# Patient Record
Sex: FEMALE | Race: BLACK OR AFRICAN AMERICAN | Employment: OTHER | URBAN - METROPOLITAN AREA
[De-identification: names, ages, dates, MRNs, and addresses within clinical notes are randomized per-mention and may not be internally consistent; named-entity substitution may affect disease eponyms.]

---

## 2019-10-05 LAB

## 2020-09-04 RX ORDER — NORGESTIMATE AND ETHINYL ESTRADIOL 0.25-0.035
1 KIT ORAL DAILY
Qty: 30 TABLET | Refills: 5 | Status: SHIPPED
Start: 2020-09-04 | End: 2020-11-23 | Stop reason: SDUPTHER

## 2020-09-04 RX ORDER — NORGESTIMATE AND ETHINYL ESTRADIOL 0.25-0.035
.25-35 KIT ORAL DAILY
COMMUNITY
Start: 2020-08-11 | End: 2020-09-04 | Stop reason: SDUPTHER

## 2020-09-16 RX ORDER — LORAZEPAM 0.5 MG/1
0.5 TABLET ORAL DAILY
COMMUNITY
Start: 2020-08-13 | End: 2020-09-16 | Stop reason: SDUPTHER

## 2020-09-16 RX ORDER — CITALOPRAM 40 MG/1
TABLET ORAL
COMMUNITY
Start: 2020-09-15 | End: 2020-10-30

## 2020-09-16 RX ORDER — LORAZEPAM 0.5 MG/1
0.25 TABLET ORAL DAILY
Qty: 16 TABLET | Refills: 0 | Status: SHIPPED
Start: 2020-09-16 | End: 2020-09-30 | Stop reason: SDUPTHER

## 2020-09-29 RX ORDER — NAPROXEN SODIUM 220 MG
220 TABLET ORAL DAILY PRN
COMMUNITY
End: 2020-11-23 | Stop reason: SDUPTHER

## 2020-09-29 RX ORDER — TRIAMCINOLONE ACETONIDE 1 MG/G
CREAM TOPICAL 2 TIMES DAILY PRN
COMMUNITY
End: 2022-02-11

## 2020-09-29 RX ORDER — IBUPROFEN 200 MG
400 TABLET ORAL EVERY 8 HOURS PRN
COMMUNITY
End: 2020-11-23 | Stop reason: SDUPTHER

## 2020-09-29 RX ORDER — ACETAMINOPHEN 160 MG/5ML
SOLUTION ORAL EVERY 4 HOURS PRN
COMMUNITY
End: 2020-11-23

## 2020-09-30 RX ORDER — LORAZEPAM 0.5 MG/1
0.25 TABLET ORAL DAILY
Qty: 16 TABLET | Refills: 0 | Status: SHIPPED
Start: 2020-09-30 | End: 2020-10-30 | Stop reason: SDUPTHER

## 2020-10-30 RX ORDER — LORAZEPAM 0.5 MG/1
0.25 TABLET ORAL DAILY
Qty: 16 TABLET | Refills: 0 | Status: SHIPPED
Start: 2020-10-30 | End: 2020-11-23 | Stop reason: SDUPTHER

## 2020-10-30 RX ORDER — CITALOPRAM 40 MG/1
40 TABLET ORAL DAILY
Qty: 31 TABLET | Refills: 5 | Status: SHIPPED
Start: 2020-10-30 | End: 2020-11-23 | Stop reason: SDUPTHER

## 2020-11-23 ENCOUNTER — OFFICE VISIT (OUTPATIENT)
Dept: PRIMARY CARE CLINIC | Age: 30
End: 2020-11-23
Payer: MEDICARE

## 2020-11-23 VITALS
TEMPERATURE: 97.6 F | SYSTOLIC BLOOD PRESSURE: 102 MMHG | HEIGHT: 64 IN | DIASTOLIC BLOOD PRESSURE: 68 MMHG | BODY MASS INDEX: 13.83 KG/M2 | OXYGEN SATURATION: 93 % | HEART RATE: 109 BPM | WEIGHT: 81 LBS

## 2020-11-23 PROCEDURE — 99214 OFFICE O/P EST MOD 30 MIN: CPT | Performed by: NURSE PRACTITIONER

## 2020-11-23 PROCEDURE — 90688 IIV4 VACCINE SPLT 0.5 ML IM: CPT | Performed by: NURSE PRACTITIONER

## 2020-11-23 PROCEDURE — G0008 ADMIN INFLUENZA VIRUS VAC: HCPCS | Performed by: NURSE PRACTITIONER

## 2020-11-23 RX ORDER — DOCUSATE SODIUM 100 MG/1
100 CAPSULE, LIQUID FILLED ORAL 2 TIMES DAILY
Qty: 60 CAPSULE | Refills: 0 | Status: SHIPPED
Start: 2020-11-23 | End: 2020-12-15 | Stop reason: SDUPTHER

## 2020-11-23 RX ORDER — LORAZEPAM 0.5 MG/1
0.25 TABLET ORAL DAILY
Qty: 16 TABLET | Refills: 0 | Status: SHIPPED
Start: 2020-11-23 | End: 2020-12-24

## 2020-11-23 RX ORDER — ACETAMINOPHEN 325 MG/1
650 TABLET ORAL EVERY 6 HOURS PRN
COMMUNITY
End: 2020-11-23 | Stop reason: SDUPTHER

## 2020-11-23 RX ORDER — NAPROXEN SODIUM 220 MG
220 TABLET ORAL DAILY PRN
Qty: 30 TABLET | Refills: 5 | Status: SHIPPED
Start: 2020-11-23 | End: 2021-10-12

## 2020-11-23 RX ORDER — ACETAMINOPHEN 325 MG/1
325 TABLET ORAL EVERY 6 HOURS PRN
Qty: 120 TABLET | Refills: 5 | Status: SHIPPED
Start: 2020-11-23 | End: 2022-04-15 | Stop reason: SDUPTHER

## 2020-11-23 RX ORDER — CITALOPRAM 40 MG/1
40 TABLET ORAL DAILY
Qty: 30 TABLET | Refills: 5 | Status: SHIPPED
Start: 2020-11-23 | End: 2021-09-23

## 2020-11-23 RX ORDER — PYRIDOXINE HCL (VITAMIN B6) 100 MG
500 TABLET ORAL 2 TIMES DAILY
Qty: 60 CAPSULE | Refills: 3 | Status: SHIPPED
Start: 2020-11-23 | End: 2021-02-25

## 2020-11-23 RX ORDER — CLOTRIMAZOLE AND BETAMETHASONE DIPROPIONATE 10; .64 MG/G; MG/G
CREAM TOPICAL
Qty: 1 TUBE | Refills: 3 | Status: SHIPPED
Start: 2020-11-23 | End: 2022-02-11

## 2020-11-23 RX ORDER — IBUPROFEN 200 MG
200 TABLET ORAL EVERY 8 HOURS PRN
Qty: 120 TABLET | Refills: 5 | Status: SHIPPED
Start: 2020-11-23 | End: 2021-02-15 | Stop reason: SDUPTHER

## 2020-11-23 RX ORDER — NORGESTIMATE AND ETHINYL ESTRADIOL 0.25-0.035
1 KIT ORAL DAILY
Qty: 30 TABLET | Refills: 5 | Status: SHIPPED
Start: 2020-11-23 | End: 2021-07-13

## 2020-11-23 ASSESSMENT — ENCOUNTER SYMPTOMS
BACK PAIN: 0
TROUBLE SWALLOWING: 0
ABDOMINAL PAIN: 0
CONSTIPATION: 0
BLOOD IN STOOL: 0
DIARRHEA: 0
WHEEZING: 0
CHEST TIGHTNESS: 0
NAUSEA: 0
COUGH: 0
SHORTNESS OF BREATH: 0
VOICE CHANGE: 0
VOMITING: 0

## 2020-11-23 ASSESSMENT — PATIENT HEALTH QUESTIONNAIRE - PHQ9
2. FEELING DOWN, DEPRESSED OR HOPELESS: 0
SUM OF ALL RESPONSES TO PHQ QUESTIONS 1-9: 0
SUM OF ALL RESPONSES TO PHQ9 QUESTIONS 1 & 2: 0
1. LITTLE INTEREST OR PLEASURE IN DOING THINGS: 0

## 2020-11-23 NOTE — PATIENT INSTRUCTIONS
Patient Education        Medication Refill: Care Instructions  Your Care Instructions     Medicines are a big part of treatment for many health problems. So it can be upsetting to run out of your medicine. It may even be dangerous to stop a medicine suddenly. The doctor may have given you enough medicine to help you until you can see your regular doctor. The doctor has checked you carefully, but problems can develop later. If you notice any problems or new symptoms, get medical treatment right away. Follow-up care is a key part of your treatment and safety. Be sure to make and go to all appointments, and call your doctor if you are having problems. It's also a good idea to know your test results and keep a list of the medicines you take. How can you care for yourself at home? · Be safe with medicines. Take your medicines exactly as prescribed. · Know when you will run out of your medicine. Use a calendar to remind you to get a refill. Don't wait until you have only a few pills left. · Talk with your doctor or pharmacist about your medicine. Find out what to do if you miss a dose. When should you call for help? Call your doctor now or seek immediate medical care if:    · You have problems with your medicine. Watch closely for changes in your health, and be sure to contact your doctor if you have any problems. Where can you learn more? Go to https://[employer]. org and sign in to your [employer] account. Enter K326 in the [facility] box to learn more about \"Medication Refill: Care Instructions. \"     If you do not have an account, please click on the \"Sign Up Now\" link. Current as of: August 22, 2019               Content Version: 12.6  © 1169-9922 [employer] for [employer] and Android, Incorporated. Care instructions adapted under license by [facility] ([facility]).  If you have questions about a medical condition or this instruction, always ask your healthcare professional. [place] any warranty or liability for your use of this information.

## 2020-12-15 RX ORDER — DOCUSATE SODIUM 100 MG/1
100 CAPSULE, LIQUID FILLED ORAL 2 TIMES DAILY
Qty: 62 CAPSULE | Refills: 0 | Status: SHIPPED
Start: 2020-12-15 | End: 2020-12-22 | Stop reason: SDUPTHER

## 2020-12-22 RX ORDER — DOCUSATE SODIUM 100 MG/1
100 CAPSULE, LIQUID FILLED ORAL 2 TIMES DAILY
Qty: 62 CAPSULE | Refills: 5 | Status: SHIPPED
Start: 2020-12-22 | End: 2020-12-23 | Stop reason: SDUPTHER

## 2020-12-23 RX ORDER — DOCUSATE SODIUM 100 MG/1
100 CAPSULE, LIQUID FILLED ORAL 2 TIMES DAILY
Qty: 62 CAPSULE | Refills: 5 | Status: SHIPPED | OUTPATIENT
Start: 2020-12-23 | End: 2021-01-22

## 2020-12-28 RX ORDER — LORAZEPAM 0.5 MG/1
0.25 TABLET ORAL DAILY
Qty: 16 TABLET | Refills: 0 | Status: SHIPPED
Start: 2020-12-28 | End: 2021-01-25 | Stop reason: SDUPTHER

## 2020-12-28 RX ORDER — LORAZEPAM 0.5 MG/1
0.25 TABLET ORAL DAILY
COMMUNITY
End: 2020-12-28 | Stop reason: SDUPTHER

## 2021-01-25 DIAGNOSIS — F41.9 ANXIETY: ICD-10-CM

## 2021-01-25 RX ORDER — LORAZEPAM 0.5 MG/1
0.25 TABLET ORAL DAILY
Qty: 16 TABLET | Refills: 0 | Status: SHIPPED
Start: 2021-01-25 | End: 2021-07-28 | Stop reason: SDUPTHER

## 2021-02-15 DIAGNOSIS — R19.7 DIARRHEA, UNSPECIFIED TYPE: ICD-10-CM

## 2021-02-15 DIAGNOSIS — R50.9 FEVER, UNSPECIFIED FEVER CAUSE: ICD-10-CM

## 2021-02-15 DIAGNOSIS — M54.50 LOW BACK PAIN WITHOUT SCIATICA, UNSPECIFIED BACK PAIN LATERALITY, UNSPECIFIED CHRONICITY: Primary | ICD-10-CM

## 2021-02-15 DIAGNOSIS — K30 UPSET STOMACH: ICD-10-CM

## 2021-02-15 RX ORDER — ACETAMINOPHEN 160 MG/5ML
LIQUID ORAL
Qty: 473 ML | Refills: 5 | Status: SHIPPED
Start: 2021-02-15 | End: 2022-02-11

## 2021-02-15 RX ORDER — IBUPROFEN 200 MG
200 TABLET ORAL EVERY 8 HOURS PRN
Qty: 120 TABLET | Refills: 5 | Status: SHIPPED
Start: 2021-02-15 | End: 2022-04-15 | Stop reason: SDUPTHER

## 2021-02-15 RX ORDER — ACETAMINOPHEN 160 MG/5ML
10 SUSPENSION, ORAL (FINAL DOSE FORM) ORAL EVERY 4 HOURS PRN
Qty: 240 ML | Refills: 3 | Status: SHIPPED
Start: 2021-02-15 | End: 2022-02-11

## 2021-02-15 RX ORDER — ACETAMINOPHEN 160 MG/5ML
10 SUSPENSION, ORAL (FINAL DOSE FORM) ORAL EVERY 4 HOURS PRN
COMMUNITY
End: 2021-02-15 | Stop reason: SDUPTHER

## 2021-02-15 NOTE — TELEPHONE ENCOUNTER
Last Appointment:  11/23/2020  No future appointments. Patient needs pended med refilled.     Electronically signed by Jessica Pryor LPN on 6/33/1348 at 7:77 PM

## 2021-02-25 DIAGNOSIS — F41.9 ANXIETY: Primary | ICD-10-CM

## 2021-02-25 RX ORDER — LORAZEPAM 0.5 MG/1
0.25 TABLET ORAL DAILY PRN
Qty: 15 TABLET | Refills: 5 | Status: SHIPPED
Start: 2021-02-25 | End: 2021-03-27

## 2021-02-25 RX ORDER — LORAZEPAM 0.5 MG/1
0.25 TABLET ORAL
COMMUNITY
End: 2021-02-25 | Stop reason: SDUPTHER

## 2021-02-25 NOTE — TELEPHONE ENCOUNTER
Last Appointment:  11/23/2020  Future Appointments   Date Time Provider Naeem Puente   3/5/2021  1:00 PM Lyndsey Mcginnis, APRN -  [facility]'s pharmacy requests refill of pended medication.     Electronically signed by Rahul Mendoza LPN on 0/94/2152 at 86:52 AM

## 2021-03-05 ENCOUNTER — OFFICE VISIT (OUTPATIENT)
Dept: PRIMARY CARE CLINIC | Age: 31
End: 2021-03-05
Payer: MEDICARE

## 2021-03-05 VITALS
RESPIRATION RATE: 18 BRPM | HEIGHT: 64 IN | TEMPERATURE: 98.2 F | WEIGHT: 79 LBS | DIASTOLIC BLOOD PRESSURE: 68 MMHG | OXYGEN SATURATION: 99 % | SYSTOLIC BLOOD PRESSURE: 105 MMHG | HEART RATE: 82 BPM | BODY MASS INDEX: 13.49 KG/M2

## 2021-03-05 DIAGNOSIS — R19.7 DIARRHEA, UNSPECIFIED TYPE: ICD-10-CM

## 2021-03-05 DIAGNOSIS — Z79.899 LONG-TERM CURRENT USE OF ANXIOLYTIC MEDICATION: ICD-10-CM

## 2021-03-05 DIAGNOSIS — F41.9 ANXIETY: ICD-10-CM

## 2021-03-05 DIAGNOSIS — F84.0 AUTISTIC DISORDER: Primary | ICD-10-CM

## 2021-03-05 DIAGNOSIS — Z13.29 SCREENING FOR THYROID DISORDER: ICD-10-CM

## 2021-03-05 DIAGNOSIS — Z13.220 SCREENING, LIPID: ICD-10-CM

## 2021-03-05 DIAGNOSIS — F79 MENTAL DEFICIENCY: ICD-10-CM

## 2021-03-05 PROCEDURE — 99214 OFFICE O/P EST MOD 30 MIN: CPT | Performed by: NURSE PRACTITIONER

## 2021-03-05 PROCEDURE — G8427 DOCREV CUR MEDS BY ELIG CLIN: HCPCS | Performed by: NURSE PRACTITIONER

## 2021-03-05 PROCEDURE — G8482 FLU IMMUNIZE ORDER/ADMIN: HCPCS | Performed by: NURSE PRACTITIONER

## 2021-03-05 PROCEDURE — G8419 CALC BMI OUT NRM PARAM NOF/U: HCPCS | Performed by: NURSE PRACTITIONER

## 2021-03-05 PROCEDURE — 1036F TOBACCO NON-USER: CPT | Performed by: NURSE PRACTITIONER

## 2021-03-05 ASSESSMENT — ENCOUNTER SYMPTOMS
WHEEZING: 0
CONSTIPATION: 0
BACK PAIN: 0
CHEST TIGHTNESS: 0
NAUSEA: 0
TROUBLE SWALLOWING: 0
BLOOD IN STOOL: 0
VOICE CHANGE: 0
VOMITING: 0
ABDOMINAL PAIN: 0
SHORTNESS OF BREATH: 0
COUGH: 0
DIARRHEA: 0

## 2021-03-05 ASSESSMENT — PATIENT HEALTH QUESTIONNAIRE - PHQ9
2. FEELING DOWN, DEPRESSED OR HOPELESS: 0
SUM OF ALL RESPONSES TO PHQ QUESTIONS 1-9: 0
SUM OF ALL RESPONSES TO PHQ QUESTIONS 1-9: 0

## 2021-03-05 NOTE — PATIENT INSTRUCTIONS
Patient Education        Learning About Autism Spectrum Disorder (ASD) in Adults  What is autism spectrum disorder (ASD) in adults? Autism spectrum disorder (ASD) is a developmental disorder. It affects a person's behavior. And it makes communication and social interactions hard. ASD can range from mild to severe. The type of symptoms a person has and how severe they are varies. Some adults who have ASD may not be able to function without a lot of help from parents and other caregivers. Others may learn social and verbal skills and be able to care for themselves. Most people who have ASD will always have some trouble when they communicate or interact with others. But treatment for ASD has helped many people who have it to lead full lives. ASD now includes conditions that used to be diagnosed separately. These include:  · Autism. · Asperger's syndrome. · Pervasive developmental disorder. · Childhood disintegrative disorder. You or your doctor might use any of these terms to describe the condition. What are the symptoms of autism spectrum disorder (ASD) in adults? Adults with ASD have some symptoms in these areas:  Communication and social interactions. Symptoms may include:  Problems using or responding to gestures or pointing, facial expressions, and body posture. And there may be problems making eye contact. Problems making friends or dating. And there may be problems bonding with a child or partner. Or there may be problems working with colleagues. Lack of interest in sharing enjoyment, interests, or achievements with others. Problems starting a conversation. Repetitive behaviors and limited interests in activities. Symptoms may include:  Getting attached to objects or topics. A strong need for sameness and routines. Repetitive use of language. An adult with ASD may keep repeating a phrase they have heard. Counseling. Cognitive behavioral therapy might be used to treat anxiety and depression. Couples and family therapy can help improve relationships. Medicines. Medicines might be used to treat ASD symptoms. These include being cranky or hyperactive. Sometimes medicine is used to treat other problems, like anxiety and depression. Current as of: January 31, 2020               Content Version: 12.6  © 2717-6919 [employer]. Care instructions adapted under license by [facility] ([facility]). If you have questions about a medical condition or this instruction, always ask your healthcare professional. [place] [address] any warranty or liability for your use of this information.

## 2021-03-05 NOTE — PROGRESS NOTES
3/5/21  Arnav Rios : 1990 Sex: female  Age: 26 y.o. Chief Complaint   Patient presents with    6 Month Follow-Up     States that the fungus healed up under her left arm pit but now it is under her right arm pit. ASSESSMENT/PLAN:    1. Autistic disorder  2. Mental deficiency  3. Diarrhea, unspecified type  4. Long-term current use of anxiolytic medication  5. Screening, lipid  6. Screening for thyroid disorder  7. Anxiety      Return in about 4 months (around 2021). PLAN MOVING FORWARD:  We will get fasting labs on the patient    Follow-up in 4 months for evaluation  On this date 3/5/2021 I have spent 40 minutes reviewing previous notes, test results and face to face with the patient discussing the diagnosis and importance of compliance with the treatment plan as well as documenting on the day of the visit. Educational materials printed for patient's review and were included in patient instructions on her After Visit Summary and given to patient at the end of visit. Counseled regarding above diagnosis, including possible risks and complications,  especially if left uncontrolled. Counseled regarding the possible side effects, risks, benefits and alternatives to treatment; patient verbalizes understanding, agrees, feels comfortable with and wishes to proceed with above plan moving forward. Advised patient to call with any new medication issues, and read all Rx info from pharmacy to assure aware of all possible risks and side effects of medication before taking. Reviewed age and gender appropriate health screening exams and vaccinations. Advised patient regarding importance of keeping up with recommended health maintenance and to schedule as soon as possible if overdue, as this is important in assessing for undiagnosed pathology, especially cancer, as well as protecting against potentially harmful/life threatening disease. All questions answered. SUBJECTIVE/OBJECTIVE:    This is a very pleasant 55-year-old autistic mentally challenged frail-appearing female who is in a good mood today. Her medications are pretty much on point and she is doing well. I do not have any labs to go over but they were ordered last visit. Her tinea under her left armpit is completely resolved but now she has some in the right armpit and caretaker would like a as needed order for Lotrisone to the region when needed. CHRONIC CONDITION:        Depression/Anxiety: Stable depression with generalized anxiety. Mild in intensity but well controlled on  *  LORazepam (ATIVAN) 0.5 MG tablet, Take 0.5 tablets by mouth daily as needed for Anxiety for up to 30 days. Take 1/2 tablet PO daily at 4 pm., Disp: 15 tablet, Rfl: 5  Acetaminophen Childrens 160 MG/5ML SOLN, TAKE 2 TEASPOONSFUL (10ML) BY MOUTH EVERY FOUR HOURS AS NEEDED FOR ELEVATED TEMPERATURE OVER 100 DEGREES., Disp: 473 mL, Rfl: 5  ibuprofen (ADVIL;MOTRIN) 200 MG tablet, Take 1 tablet by mouth every 8 hours as needed for Pain NO MORE THAN 6 PER DAY, Disp: 120 tablet, Rfl: 5  acetaminophen (TYLENOL) 160 MG/5ML suspension, Take 11.47 mLs by mouth every 4 hours as needed for Fever Elevated temperature over 100 degrees, Disp: 240 mL, Rfl: 3uth daily, Disp: 30 tablet, Rfl: 5  citalopram (CELEXA) 40 MG tablet, Take 1 tablet by mouth daily, Disp: 30 tablet, Rfl: 5 , without symptoms, no weight gain, no increase in anxiety, no suicidal or homicidal ideation's no unexplained fatigue, or relationship difficulties relayed this visit. Review of Systems   Constitutional: Negative for activity change, chills, diaphoresis, fatigue, fever and unexpected weight change. HENT: Negative for trouble swallowing and voice change. Eyes: Negative for visual disturbance. Respiratory: Negative for cough, chest tightness, shortness of breath and wheezing. Cardiovascular: Negative for chest pain, palpitations and leg swelling. Gastrointestinal: Negative for abdominal pain, blood in stool, constipation, diarrhea, nausea and vomiting. Endocrine: Negative for polydipsia, polyphagia and polyuria. Genitourinary: Negative for dysuria, enuresis, frequency and hematuria. Musculoskeletal: Positive for gait problem. Negative for arthralgias, back pain, joint swelling, myalgias and neck stiffness. Skin: Negative for rash. Neurological: Negative for dizziness, seizures, syncope, facial asymmetry, weakness, light-headedness, numbness and headaches. Hematological: Does not bruise/bleed easily. Psychiatric/Behavioral: Negative for behavioral problems, confusion, hallucinations and suicidal ideas. The patient is not nervous/anxious. Current Outpatient Medications:     CRANBERRY CONCENTRATE 500 MG CAPS, TAKE 1 CAPSULE BY MOUTH TWICE A DAY, Disp: 62 capsule, Rfl: 5    LORazepam (ATIVAN) 0.5 MG tablet, Take 0.5 tablets by mouth daily as needed for Anxiety for up to 30 days. Take 1/2 tablet PO daily at 4 pm., Disp: 15 tablet, Rfl: 5    bismuth subsalicylate (PEPTO BISMOL) 262 MG/15ML suspension, Take 30 mLs by mouth every 4 hours as needed for Nausea or Diarrhea, Disp: 237 mL, Rfl: 5    Acetaminophen Childrens 160 MG/5ML SOLN, TAKE 2 TEASPOONSFUL (10ML) BY MOUTH EVERY FOUR HOURS AS NEEDED FOR ELEVATED TEMPERATURE OVER 100 DEGREES., Disp: 473 mL, Rfl: 5    ibuprofen (ADVIL;MOTRIN) 200 MG tablet, Take 1 tablet by mouth every 8 hours as needed for Pain NO MORE THAN 6 PER DAY, Disp: 120 tablet, Rfl: 5    acetaminophen (TYLENOL) 160 MG/5ML suspension, Take 11.47 mLs by mouth every 4 hours as needed for Fever Elevated temperature over 100 degrees, Disp: 240 mL, Rfl: 3    clotrimazole-betamethasone (LOTRISONE) 1-0.05 % cream, Apply topically 2 times daily. , Disp: 1 Tube, Rfl: 3    triamcinolone (KENALOG) 0.1 % cream, Apply topically 2 times daily as needed FOR 15 DAYS, APPLY SPARINGLY, Disp: , Rfl:     naproxen sodium (ANAPROX) 220 MG tablet, Take 1 tablet by mouth daily as needed for Pain MENSTRUAL CYCLE, Disp: 30 tablet, Rfl: 5    acetaminophen (TYLENOL) 325 MG tablet, Take 1 tablet by mouth every 6 hours as needed for Fever, Disp: 120 tablet, Rfl: 5    ESTARYLLA 0.25-35 MG-MCG per tablet, Take 1 tablet by mouth daily, Disp: 30 tablet, Rfl: 5    citalopram (CELEXA) 40 MG tablet, Take 1 tablet by mouth daily, Disp: 30 tablet, Rfl: 5    No Known Allergies    Vitals:    03/05/21 1316   BP: 105/68   Pulse: 82   Resp: 18   Temp: 98.2 °F (36.8 °C)   SpO2: 99%   Weight: 79 lb (35.8 kg)   Height: 5' 4\" (1.626 m)       Wt Readings from Last 3 Encounters:   03/05/21 79 lb (35.8 kg)   11/23/20 81 lb (36.7 kg)       BP Readings from Last 3 Encounters:   03/05/21 105/68   11/23/20 102/68       No results found for this visit on 03/05/21. Past Medical History:   Diagnosis Date    Autistic disorder     Mental deficiency      No past surgical history on file. No family history on file.   Social History     Socioeconomic History    Marital status: Single     Spouse name: Not on file    Number of children: Not on file    Years of education: Not on file    Highest education level: Not on file   Occupational History    Not on file   Social Needs    Financial resource strain: Not on file    Food insecurity     Worry: Not on file     Inability: Not on file    Transportation needs     Medical: Not on file     Non-medical: Not on file   Tobacco Use    Smoking status: Never Smoker    Smokeless tobacco: Never Used   Substance and Sexual Activity    Alcohol use: Not on file     Comment: NON DRINKER    Drug use: Not on file    Sexual activity: Not on file   Lifestyle    Physical activity     Days per week: Not on file     Minutes per session: Not on file    Stress: Not on file   Relationships    Social connections     Talks on phone: Not on file     Gets together: Not on file     Attends Mosque service: Not on file     Active member of club or organization: Not on file     Attends meetings of clubs or organizations: Not on file     Relationship status: Not on file    Intimate partner violence     Fear of current or ex partner: Not on file     Emotionally abused: Not on file     Physically abused: Not on file     Forced sexual activity: Not on file   Other Topics Concern    Not on file   Social History Narrative    Not on file       Physical Exam  Constitutional:       Appearance: Normal appearance. HENT:      Head: Normocephalic. Nose: Nose normal.   Eyes:      Pupils: Pupils are equal, round, and reactive to light. Cardiovascular:      Rate and Rhythm: Normal rate and regular rhythm. Pulmonary:      Effort: Pulmonary effort is normal.   Abdominal:      General: Abdomen is flat. Palpations: Abdomen is soft. Musculoskeletal: Normal range of motion. Skin:     General: Skin is warm and dry. Neurological:      General: No focal deficit present. Mental Status: She is alert. Mental status is at baseline. Psychiatric:         Mood and Affect: Mood normal.                        Seen By:  HAMLET Briseno CNP  *NOTE: This report was transcribed using voice recognition software. Every effort was made to ensure accuracy; however, inadvertent computerized transcription errors may be present.

## 2021-03-13 LAB
ALBUMIN SERPL-MCNC: NORMAL G/DL
ALP BLD-CCNC: NORMAL U/L
ALT SERPL-CCNC: NORMAL U/L
ANION GAP SERPL CALCULATED.3IONS-SCNC: NORMAL MMOL/L
AST SERPL-CCNC: NORMAL U/L
BASOPHILS ABSOLUTE: NORMAL
BASOPHILS RELATIVE PERCENT: NORMAL
BILIRUB SERPL-MCNC: NORMAL MG/DL
BUN BLDV-MCNC: NORMAL MG/DL
CALCIUM SERPL-MCNC: NORMAL MG/DL
CHLORIDE BLD-SCNC: NORMAL MMOL/L
CHOLESTEROL, TOTAL: 157 MG/DL
CHOLESTEROL/HDL RATIO: 1.3
CO2: NORMAL
CREAT SERPL-MCNC: NORMAL MG/DL
EOSINOPHILS ABSOLUTE: NORMAL
EOSINOPHILS RELATIVE PERCENT: NORMAL
GFR CALCULATED: NORMAL
GLUCOSE BLD-MCNC: NORMAL MG/DL
HCT VFR BLD CALC: NORMAL %
HDLC SERPL-MCNC: 62 MG/DL (ref 35–70)
HEMOGLOBIN: NORMAL
LDL CHOLESTEROL CALCULATED: 81 MG/DL (ref 0–160)
LYMPHOCYTES ABSOLUTE: NORMAL
LYMPHOCYTES RELATIVE PERCENT: NORMAL
MCH RBC QN AUTO: NORMAL PG
MCHC RBC AUTO-ENTMCNC: NORMAL G/DL
MCV RBC AUTO: NORMAL FL
MONOCYTES ABSOLUTE: NORMAL
MONOCYTES RELATIVE PERCENT: NORMAL
NEUTROPHILS ABSOLUTE: NORMAL
NEUTROPHILS RELATIVE PERCENT: NORMAL
NONHDLC SERPL-MCNC: NORMAL MG/DL
PDW BLD-RTO: NORMAL %
PLATELET # BLD: NORMAL 10*3/UL
PMV BLD AUTO: NORMAL FL
POTASSIUM SERPL-SCNC: NORMAL MMOL/L
RBC # BLD: NORMAL 10*6/UL
SODIUM BLD-SCNC: NORMAL MMOL/L
TOTAL PROTEIN: NORMAL
TRIGL SERPL-MCNC: 37 MG/DL
TSH SERPL DL<=0.05 MIU/L-ACNC: NORMAL M[IU]/L
VLDLC SERPL CALC-MCNC: NORMAL MG/DL
WBC # BLD: NORMAL 10*3/UL

## 2021-03-22 ENCOUNTER — TELEPHONE (OUTPATIENT)
Dept: PRIMARY CARE CLINIC | Age: 31
End: 2021-03-22

## 2021-03-22 NOTE — TELEPHONE ENCOUNTER
Spoke with lab at [facility] per Corey this one is okay.      Electronically signed by Ad Pendleton LPN on 1/12/5336 at 0:51 PM

## 2021-03-22 NOTE — TELEPHONE ENCOUNTER
Last Appointment:  3/5/2021  Future Appointments   Date Time Provider Naeem Puente   7/9/2021  1:00 PM HAMLET Jones - CNP [facility] Arabella Zavala from [address] [place] [address] [phone] called lab order does not have diagnosis on it.  advised Nikhil Bautista to have lifetime medication use added to order and sent over to [facility].     Electronically signed by Ritesh Smith LPN on 1/72/4054 at 3:18 AM

## 2021-03-30 DIAGNOSIS — Z13.220 SCREENING, LIPID: ICD-10-CM

## 2021-03-30 DIAGNOSIS — F41.9 ANXIETY: ICD-10-CM

## 2021-03-30 DIAGNOSIS — Z13.29 SCREENING FOR THYROID DISORDER: ICD-10-CM

## 2021-03-30 DIAGNOSIS — Z79.899 LONG-TERM CURRENT USE OF ANXIOLYTIC MEDICATION: ICD-10-CM

## 2021-04-04 PROBLEM — Z13.29 SCREENING FOR THYROID DISORDER: Status: RESOLVED | Noted: 2021-03-05 | Resolved: 2021-04-04

## 2021-04-26 ENCOUNTER — OFFICE VISIT (OUTPATIENT)
Dept: PRIMARY CARE CLINIC | Age: 31
End: 2021-04-26
Payer: MEDICARE

## 2021-04-26 VITALS
SYSTOLIC BLOOD PRESSURE: 98 MMHG | HEART RATE: 78 BPM | BODY MASS INDEX: 18.74 KG/M2 | WEIGHT: 81 LBS | TEMPERATURE: 97.8 F | OXYGEN SATURATION: 99 % | HEIGHT: 55 IN | DIASTOLIC BLOOD PRESSURE: 60 MMHG

## 2021-04-26 DIAGNOSIS — F41.9 ANXIETY: ICD-10-CM

## 2021-04-26 DIAGNOSIS — F84.0 AUTISTIC DISORDER: Primary | ICD-10-CM

## 2021-04-26 DIAGNOSIS — F79 MENTAL DEFICIENCY: ICD-10-CM

## 2021-04-26 DIAGNOSIS — M43.6 TORTICOLLIS: ICD-10-CM

## 2021-04-26 DIAGNOSIS — Z79.899 LONG-TERM CURRENT USE OF ANXIOLYTIC MEDICATION: ICD-10-CM

## 2021-04-26 PROCEDURE — G8420 CALC BMI NORM PARAMETERS: HCPCS | Performed by: NURSE PRACTITIONER

## 2021-04-26 PROCEDURE — G8427 DOCREV CUR MEDS BY ELIG CLIN: HCPCS | Performed by: NURSE PRACTITIONER

## 2021-04-26 PROCEDURE — 99213 OFFICE O/P EST LOW 20 MIN: CPT | Performed by: NURSE PRACTITIONER

## 2021-04-26 PROCEDURE — 1036F TOBACCO NON-USER: CPT | Performed by: NURSE PRACTITIONER

## 2021-04-26 ASSESSMENT — ENCOUNTER SYMPTOMS
COUGH: 0
CONSTIPATION: 0
TROUBLE SWALLOWING: 0
VOICE CHANGE: 0
CHEST TIGHTNESS: 0
BLOOD IN STOOL: 0
BACK PAIN: 0
DIARRHEA: 0
NAUSEA: 0
VOMITING: 0
WHEEZING: 0
ABDOMINAL PAIN: 0
SHORTNESS OF BREATH: 0

## 2021-04-26 NOTE — PROGRESS NOTES
3/5/21  Lily Puga : 1990 Sex: female  Age: 26 y.o. Chief Complaint   Patient presents with    6 Month Follow-Up    Discuss Labs         ASSESSMENT/PLAN:    1. Autistic disorder  2. Mental deficiency  3. Anxiety  4. Long-term current use of anxiolytic medication  5. Torticollis      Return in about 6 months (around 10/26/2021). PLAN MOVING FORWARD:  We will get fasting labs on the patient    Follow-up in 4 months for evaluation  On this date 3/5/2021 I have spent 40 minutes reviewing previous notes, test results and face to face with the patient discussing the diagnosis and importance of compliance with the treatment plan as well as documenting on the day of the visit. Educational materials printed for patient's review and were included in patient instructions on her After Visit Summary and given to patient at the end of visit. Counseled regarding above diagnosis, including possible risks and complications,  especially if left uncontrolled. Counseled regarding the possible side effects, risks, benefits and alternatives to treatment; patient verbalizes understanding, agrees, feels comfortable with and wishes to proceed with above plan moving forward. Advised patient to call with any new medication issues, and read all Rx info from pharmacy to assure aware of all possible risks and side effects of medication before taking. Reviewed age and gender appropriate health screening exams and vaccinations. Advised patient regarding importance of keeping up with recommended health maintenance and to schedule as soon as possible if overdue, as this is important in assessing for undiagnosed pathology, especially cancer, as well as protecting against potentially harmful/life threatening disease. All questions answered. SUBJECTIVE/OBJECTIVE:    This is a very pleasant 25-year-old autistic mentally challenged frail-appearing female who is in a good mood today.   Her polyphagia and polyuria. Genitourinary: Negative for dysuria, enuresis, frequency and hematuria. Musculoskeletal: Positive for gait problem. Negative for arthralgias, back pain, joint swelling, myalgias and neck stiffness. Skin: Negative for rash. Neurological: Negative for dizziness, seizures, syncope, facial asymmetry, weakness, light-headedness, numbness and headaches. Hematological: Does not bruise/bleed easily. Psychiatric/Behavioral: Negative for behavioral problems, confusion, hallucinations and suicidal ideas. The patient is not nervous/anxious. Current Outpatient Medications:     CRANBERRY CONCENTRATE 500 MG CAPS, TAKE 1 CAPSULE BY MOUTH TWICE A DAY, Disp: 62 capsule, Rfl: 5    bismuth subsalicylate (PEPTO BISMOL) 262 MG/15ML suspension, Take 30 mLs by mouth every 4 hours as needed for Nausea or Diarrhea, Disp: 237 mL, Rfl: 5    Acetaminophen Childrens 160 MG/5ML SOLN, TAKE 2 TEASPOONSFUL (10ML) BY MOUTH EVERY FOUR HOURS AS NEEDED FOR ELEVATED TEMPERATURE OVER 100 DEGREES., Disp: 473 mL, Rfl: 5    acetaminophen (TYLENOL) 160 MG/5ML suspension, Take 11.47 mLs by mouth every 4 hours as needed for Fever Elevated temperature over 100 degrees, Disp: 240 mL, Rfl: 3    clotrimazole-betamethasone (LOTRISONE) 1-0.05 % cream, Apply topically 2 times daily. , Disp: 1 Tube, Rfl: 3    triamcinolone (KENALOG) 0.1 % cream, Apply topically 2 times daily as needed FOR 15 DAYS, APPLY SPARINGLY, Disp: , Rfl:     ibuprofen (ADVIL;MOTRIN) 200 MG tablet, Take 1 tablet by mouth every 8 hours as needed for Pain NO MORE THAN 6 PER DAY, Disp: 120 tablet, Rfl: 5    naproxen sodium (ANAPROX) 220 MG tablet, Take 1 tablet by mouth daily as needed for Pain MENSTRUAL CYCLE, Disp: 30 tablet, Rfl: 5    acetaminophen (TYLENOL) 325 MG tablet, Take 1 tablet by mouth every 6 hours as needed for Fever, Disp: 120 tablet, Rfl: 5    ESTARYLLA 0.25-35 MG-MCG per tablet, Take 1 tablet by mouth daily, Disp: 30 tablet, Rfl: 5    citalopram (CELEXA) 40 MG tablet, Take 1 tablet by mouth daily, Disp: 30 tablet, Rfl: 5    No Known Allergies    Vitals:    04/26/21 1523   BP: 98/60   Pulse: 78   Temp: 97.8 °F (36.6 °C)   TempSrc: Temporal   SpO2: 99%   Weight: 81 lb (36.7 kg)   Height: 4' 6\" (1.372 m)       Wt Readings from Last 3 Encounters:   04/26/21 81 lb (36.7 kg)   03/05/21 79 lb (35.8 kg)   11/23/20 81 lb (36.7 kg)       BP Readings from Last 3 Encounters:   04/26/21 98/60   03/05/21 105/68   11/23/20 102/68       No results found for this visit on 04/26/21. Past Medical History:   Diagnosis Date    Autistic disorder     Mental deficiency      History reviewed. No pertinent surgical history. History reviewed. No pertinent family history.   Social History     Socioeconomic History    Marital status: Single     Spouse name: Not on file    Number of children: Not on file    Years of education: Not on file    Highest education level: Not on file   Occupational History    Not on file   Social Needs    Financial resource strain: Not on file    Food insecurity     Worry: Not on file     Inability: Not on file    Transportation needs     Medical: Not on file     Non-medical: Not on file   Tobacco Use    Smoking status: Never Smoker    Smokeless tobacco: Never Used   Substance and Sexual Activity    Alcohol use: Not on file     Comment: NON DRINKER    Drug use: Not on file    Sexual activity: Not on file   Lifestyle    Physical activity     Days per week: Not on file     Minutes per session: Not on file    Stress: Not on file   Relationships    Social connections     Talks on phone: Not on file     Gets together: Not on file     Attends Sabianism service: Not on file     Active member of club or organization: Not on file     Attends meetings of clubs or organizations: Not on file     Relationship status: Not on file    Intimate partner violence     Fear of current or ex partner: Not on file     Emotionally abused: Not on file     Physically abused: Not on file     Forced sexual activity: Not on file   Other Topics Concern    Not on file   Social History Narrative    Not on file       Physical Exam  Constitutional:       Appearance: Normal appearance. HENT:      Head: Normocephalic. Nose: Nose normal.   Eyes:      Pupils: Pupils are equal, round, and reactive to light. Cardiovascular:      Rate and Rhythm: Normal rate and regular rhythm. Pulmonary:      Effort: Pulmonary effort is normal.   Abdominal:      General: Abdomen is flat. Palpations: Abdomen is soft. Musculoskeletal: Normal range of motion. Skin:     General: Skin is warm and dry. Neurological:      General: No focal deficit present. Mental Status: She is alert. Mental status is at baseline. Psychiatric:         Mood and Affect: Mood normal.                        Seen By:  HAMLET Reyes CNP  *NOTE: This report was transcribed using voice recognition software. Every effort was made to ensure accuracy; however, inadvertent computerized transcription errors may be present.

## 2021-06-28 RX ORDER — DOCUSATE SODIUM 100 MG/1
CAPSULE, LIQUID FILLED ORAL
Qty: 62 CAPSULE | Refills: 5 | Status: SHIPPED
Start: 2021-06-28 | End: 2021-12-23

## 2021-07-13 RX ORDER — NORGESTIMATE AND ETHINYL ESTRADIOL 0.25-0.035
1 KIT ORAL DAILY
Qty: 28 TABLET | Refills: 5 | Status: SHIPPED
Start: 2021-07-13 | End: 2021-12-21

## 2021-07-28 DIAGNOSIS — F41.9 ANXIETY: ICD-10-CM

## 2021-07-28 RX ORDER — LORAZEPAM 0.5 MG/1
0.25 TABLET ORAL DAILY
Qty: 16 TABLET | Refills: 0 | Status: SHIPPED
Start: 2021-07-28 | End: 2021-08-26 | Stop reason: SDUPTHER

## 2021-08-25 RX ORDER — EVENING PRIMROSE OIL 500 MG
CAPSULE ORAL
Qty: 62 CAPSULE | Refills: 5 | Status: SHIPPED
Start: 2021-08-25 | End: 2022-02-22

## 2021-08-26 DIAGNOSIS — F41.9 ANXIETY: ICD-10-CM

## 2021-08-26 RX ORDER — LORAZEPAM 0.5 MG/1
0.25 TABLET ORAL DAILY
Qty: 16 TABLET | Refills: 0 | Status: SHIPPED
Start: 2021-08-26 | End: 2021-09-24 | Stop reason: SDUPTHER

## 2021-09-23 RX ORDER — CITALOPRAM 40 MG/1
TABLET ORAL
Qty: 31 TABLET | Refills: 0 | Status: SHIPPED
Start: 2021-09-23 | End: 2021-10-26

## 2021-09-24 DIAGNOSIS — F41.9 ANXIETY: ICD-10-CM

## 2021-09-24 RX ORDER — LORAZEPAM 0.5 MG/1
0.25 TABLET ORAL DAILY
Qty: 16 TABLET | Refills: 2 | Status: SHIPPED
Start: 2021-09-24 | End: 2021-12-27 | Stop reason: SDUPTHER

## 2021-10-12 RX ORDER — NAPROXEN SODIUM 220 MG
220 TABLET ORAL DAILY
Qty: 30 TABLET | Refills: 5 | Status: SHIPPED
Start: 2021-10-12 | End: 2022-04-15 | Stop reason: SDUPTHER

## 2021-10-26 RX ORDER — CITALOPRAM 40 MG/1
TABLET ORAL
Qty: 31 TABLET | Refills: 5 | Status: SHIPPED
Start: 2021-10-26 | End: 2022-04-15 | Stop reason: SDUPTHER

## 2021-11-05 ENCOUNTER — NURSE ONLY (OUTPATIENT)
Dept: PRIMARY CARE CLINIC | Age: 31
End: 2021-11-05
Payer: MEDICARE

## 2021-11-05 DIAGNOSIS — Z23 FLU VACCINE NEED: Primary | ICD-10-CM

## 2021-11-05 PROCEDURE — G0008 ADMIN INFLUENZA VIRUS VAC: HCPCS | Performed by: NURSE PRACTITIONER

## 2021-11-05 PROCEDURE — 90674 CCIIV4 VAC NO PRSV 0.5 ML IM: CPT | Performed by: NURSE PRACTITIONER

## 2021-12-15 RX ORDER — BISMUTH SUBSALICYLATE 525 MG/30ML
LIQUID ORAL
Qty: 236 ML | Refills: 5 | Status: SHIPPED
Start: 2021-12-15 | End: 2022-04-15 | Stop reason: SDUPTHER

## 2021-12-21 RX ORDER — NORGESTIMATE AND ETHINYL ESTRADIOL 0.25-0.035
1 KIT ORAL DAILY
Qty: 28 TABLET | Refills: 5 | Status: SHIPPED
Start: 2021-12-21 | End: 2022-04-15 | Stop reason: SDUPTHER

## 2021-12-23 RX ORDER — DOCUSATE SODIUM 100 MG/1
CAPSULE, LIQUID FILLED ORAL
Qty: 62 CAPSULE | Refills: 0 | Status: SHIPPED
Start: 2021-12-23 | End: 2022-01-25

## 2021-12-27 DIAGNOSIS — F41.9 ANXIETY: ICD-10-CM

## 2021-12-28 RX ORDER — LORAZEPAM 0.5 MG/1
0.25 TABLET ORAL DAILY
Qty: 16 TABLET | Refills: 2 | Status: SHIPPED
Start: 2021-12-28 | End: 2022-04-14 | Stop reason: SDUPTHER

## 2022-01-20 ENCOUNTER — TELEPHONE (OUTPATIENT)
Dept: PRIMARY CARE CLINIC | Age: 32
End: 2022-01-20

## 2022-01-20 DIAGNOSIS — R05.3 CHRONIC COUGH: Primary | ICD-10-CM

## 2022-01-20 NOTE — TELEPHONE ENCOUNTER
Chhaya Lomas called saying pt has a dry cough, nasal congestion and if you can prescribe sth or if you would like to see here first.    Please advise.

## 2022-01-21 RX ORDER — GUAIFENESIN 600 MG/1
600 TABLET, EXTENDED RELEASE ORAL 2 TIMES DAILY
Qty: 30 TABLET | Refills: 0 | Status: SHIPPED | OUTPATIENT
Start: 2022-01-21 | End: 2022-02-05

## 2022-01-25 RX ORDER — DOCUSATE SODIUM 100 MG
CAPSULE ORAL
Qty: 62 CAPSULE | Refills: 5 | Status: SHIPPED
Start: 2022-01-25 | End: 2022-04-15 | Stop reason: SDUPTHER

## 2022-01-27 NOTE — TELEPHONE ENCOUNTER
Berta Most called today saying pt still have dry non-productive cough, if she can get something call in. Please advise.

## 2022-02-11 ENCOUNTER — OFFICE VISIT (OUTPATIENT)
Dept: PRIMARY CARE CLINIC | Age: 32
End: 2022-02-11
Payer: MEDICARE

## 2022-02-11 VITALS
OXYGEN SATURATION: 99 % | WEIGHT: 85.6 LBS | RESPIRATION RATE: 19 BRPM | HEIGHT: 55 IN | TEMPERATURE: 98.6 F | BODY MASS INDEX: 19.81 KG/M2 | DIASTOLIC BLOOD PRESSURE: 72 MMHG | HEART RATE: 85 BPM | SYSTOLIC BLOOD PRESSURE: 120 MMHG

## 2022-02-11 DIAGNOSIS — F84.0 AUTISTIC DISORDER: ICD-10-CM

## 2022-02-11 DIAGNOSIS — M43.6 TORTICOLLIS: ICD-10-CM

## 2022-02-11 DIAGNOSIS — F79 MENTAL DEFICIENCY: ICD-10-CM

## 2022-02-11 DIAGNOSIS — Z13.220 SCREENING, LIPID: ICD-10-CM

## 2022-02-11 DIAGNOSIS — Z79.899 LONG-TERM CURRENT USE OF ANXIOLYTIC MEDICATION: ICD-10-CM

## 2022-02-11 DIAGNOSIS — R05.3 CHRONIC COUGH: ICD-10-CM

## 2022-02-11 DIAGNOSIS — Z13.29 SCREENING FOR THYROID DISORDER: ICD-10-CM

## 2022-02-11 DIAGNOSIS — F41.9 ANXIETY: ICD-10-CM

## 2022-02-11 DIAGNOSIS — R05.9 COUGH: Primary | ICD-10-CM

## 2022-02-11 PROCEDURE — G8427 DOCREV CUR MEDS BY ELIG CLIN: HCPCS | Performed by: NURSE PRACTITIONER

## 2022-02-11 PROCEDURE — G8482 FLU IMMUNIZE ORDER/ADMIN: HCPCS | Performed by: NURSE PRACTITIONER

## 2022-02-11 PROCEDURE — G8420 CALC BMI NORM PARAMETERS: HCPCS | Performed by: NURSE PRACTITIONER

## 2022-02-11 PROCEDURE — 1036F TOBACCO NON-USER: CPT | Performed by: NURSE PRACTITIONER

## 2022-02-11 PROCEDURE — 99213 OFFICE O/P EST LOW 20 MIN: CPT | Performed by: NURSE PRACTITIONER

## 2022-02-11 RX ORDER — CETIRIZINE HYDROCHLORIDE 10 MG/1
10 TABLET ORAL DAILY
Qty: 30 TABLET | Refills: 0 | Status: SHIPPED
Start: 2022-02-11 | End: 2022-02-17

## 2022-02-11 SDOH — ECONOMIC STABILITY: FOOD INSECURITY: WITHIN THE PAST 12 MONTHS, YOU WORRIED THAT YOUR FOOD WOULD RUN OUT BEFORE YOU GOT MONEY TO BUY MORE.: NEVER TRUE

## 2022-02-11 SDOH — ECONOMIC STABILITY: FOOD INSECURITY: WITHIN THE PAST 12 MONTHS, THE FOOD YOU BOUGHT JUST DIDN'T LAST AND YOU DIDN'T HAVE MONEY TO GET MORE.: NEVER TRUE

## 2022-02-11 ASSESSMENT — ENCOUNTER SYMPTOMS
COUGH: 1
ABDOMINAL PAIN: 0
TROUBLE SWALLOWING: 0
DIARRHEA: 0
CONSTIPATION: 0
WHEEZING: 0
VOICE CHANGE: 0
BACK PAIN: 0
VOMITING: 0
SHORTNESS OF BREATH: 0
CHEST TIGHTNESS: 0
BLOOD IN STOOL: 0
NAUSEA: 0

## 2022-02-11 ASSESSMENT — SOCIAL DETERMINANTS OF HEALTH (SDOH): HOW HARD IS IT FOR YOU TO PAY FOR THE VERY BASICS LIKE FOOD, HOUSING, MEDICAL CARE, AND HEATING?: NOT HARD AT ALL

## 2022-02-11 NOTE — PATIENT INSTRUCTIONS
Patient Education        Chronic Cough: Care Instructions  Your Care Instructions     A cough is your body's response to something that bothers your throat or airways. Many things can cause a cough. You might cough because of a cold or the flu, bronchitis, or asthma. Smoking, postnasal drip, allergies, and stomach acid that backs up into your throat also can cause a cough. A cough can be short-term (acute) or long-term (chronic). A chronic cough lasts more than 3 weeks. A chronic cough is often caused by a long-term problem, such as asthma. Another cause might be a medicine, such as an ACE inhibitor. A cough is a symptom, not a disease. To treat a chronic cough, you may need to treat the problem that causes it. You can take a few steps at home to cough less and feel better. Some people cough or clear their throat out of habit for no clear reason. Follow-up care is a key part of your treatment and safety. Be sure to make and go to all appointments, and call your doctor if you are having problems. It's also a good idea to know your test results and keep a list of the medicines you take. How can you care for yourself at home? · Drink plenty of water and other fluids. This may help soothe a dry or sore throat. Honey or lemon juice in hot water or tea may ease a dry cough. · Prop up your head on pillows to help you breathe and ease a cough. · Do not smoke or allow others to smoke around you. Smoke can make a cough worse. If you need help quitting, talk to your doctor about stop-smoking programs and medicines. These can increase your chances of quitting for good. · Avoid exposure to smoke, dust, or other pollutants, or wear a face mask. Check with your doctor or pharmacist to find out which type of face mask will give you the most benefit. · Take cough medicine as directed by your doctor. · Try cough drops to soothe a dry or sore throat. Cough drops don't stop a cough.  Medicine-flavored cough drops are no better than candy-flavored drops or hard candy. Throat clearing  When you have a chronic cough or a disease that may cause this type of cough, you may often feel like you want to clear your throat. This helps bring up mucus. But throat clearing does not always have a cause. Throat clearing can become a habit. The more you do it, the more you feel like you need to do it. But frequent throat clearing can be hard on your vocal cords. It's like slamming them together. To help lessen throat clearing, you can try:  · Taking small sips of water. · Not clearing your throat when you feel you need to. · Swallowing hard when you want to clear your throat. You may want to ask your doctor if a medicine that thins mucus would help. When should you call for help? Call 911 anytime you think you may need emergency care. For example, call if:    · You have severe trouble breathing. Call your doctor now or seek immediate medical care if:    · You cough up blood.     · You have new or worse trouble breathing.     · You have a new or higher fever. Watch closely for changes in your health, and be sure to contact your doctor if:    · You cough more deeply or more often, especially if you notice more mucus or a change in the color of your mucus.     · You do not get better as expected. Where can you learn more? Go to https://Drikpemohindereb.[email or web address]. org and sign in to your Airware account. Enter B870 in the Tatango box to learn more about \"Chronic Cough: Care Instructions. \"     If you do not have an account, please click on the \"Sign Up Now\" link. Current as of: October 6, 2021               Content Version: 13.1  © 0903-6788 [employer]. Care instructions adapted under license by [address]  If you have questions about a medical condition or this instruction, always ask your healthcare professional. Ilan Iraheta any warranty or liability for your use of this information. Patient Education        Medication Refill: Care Instructions  Your Care Instructions     Medicines are a big part of treatment for many health problems. So it can be upsetting to run out of your medicine. It may even be dangerous to stop a medicine suddenly. The doctor may have given you enough medicine to help you until you can see your regular doctor. The doctor has checked you carefully, but problems can develop later. If you notice any problems or new symptoms, get medical treatment right away. Follow-up care is a key part of your treatment and safety. Be sure to make and go to all appointments, and call your doctor if you are having problems. It's also a good idea to know your test results and keep a list of the medicines you take. How can you care for yourself at home? · Be safe with medicines. Take your medicines exactly as prescribed. · Know when you will run out of your medicine. Use a calendar to remind you to get a refill. Don't wait until you have only a few pills left. · Talk with your doctor or pharmacist about your medicine. Find out what to do if you miss a dose. When should you call for help? Call your doctor now or seek immediate medical care if:    · You have problems with your medicine. Watch closely for changes in your health, and be sure to contact your doctor if you have any problems. Where can you learn more? Go to [email or web address] and sign in to your Soweso account. Enter K326 in the [facility] box to learn more about \"Medication Refill: Care Instructions. \"     If you do not have an account, please click on the \"Sign Up Now\" link. Current as of: February 11, 2021               Content Version: 13.1  © 7049-9304 Healthwise, Incorporated. Care instructions adapted under license by [facility] ([facility]).  If you have questions about a medical condition or this instruction, always ask your healthcare professional. Genoveva Aschoff[employer]orporated disclaims any warranty or liability for your use of this information.

## 2022-02-11 NOTE — PROGRESS NOTES
John Paul Corey : 1990 Sex: female  Age: 33 y.o. Chief Complaint   Patient presents with    Cough     dry, started one month ago, no other symptoms          ASSESSMENT/PLAN:    1. Cough  2. Chronic cough  -     CBC Auto Differential; Future  3. Anxiety  4. Autistic disorder  5. Mental deficiency  6. Long-term current use of anxiolytic medication  -     CBC Auto Differential; Future  -     Comprehensive Metabolic Panel, Fasting; Future  -     Urinalysis; Future  7. Torticollis  8. Screening, lipid  -     Lipid Panel; Future  9. Screening for thyroid disorder  -     TSH without Reflex; Future      Return in about 8 weeks (around 2022). PLAN MOVING FORWARD:  We will get fasting labs on the patient    Follow-up in 6 weeks to 8 weeks  for evaluation of the treatment plan discussed today    On this date   I have spent 30 minutes reviewing previous notes, test results and face to face with the patient discussing the diagnosis and importance of compliance with the treatment plan as well as documenting on the day of the visit. Educational materials printed for patient's review and were included in patient instructions on her After Visit Summary and given to patient at the end of visit. Counseled regarding above diagnosis, including possible risks and complications,  especially if left uncontrolled. Counseled regarding the possible side effects, risks, benefits and alternatives to treatment; patient verbalizes understanding, agrees, feels comfortable with and wishes to proceed with above plan moving forward. Advised patient to call with any new medication issues, and read all Rx info from pharmacy to assure aware of all possible risks and side effects of medication before taking. Reviewed age and gender appropriate health screening exams and vaccinations.   Advised patient regarding importance of keeping up with recommended health maintenance and to schedule as soon as possible if overdue, as this is important in assessing for undiagnosed pathology, especially cancer, as well as protecting against potentially harmful/life threatening disease. All questions answered. SUBJECTIVE/OBJECTIVE:    Cough non productive Chest X-Ray neg, no fever no other symptoms. At this point with thinking either allergies or silent reflux. I want to go ahead and try her out on Zyrtec 1 a day 10 mg and see if the cough gets better in 2 weeks if not we will add Singulair and again wait 2 weeks and see if the cough gets better. If the cough does not get better and I am going to stop the Zyrtec and stop the Singulair and start her on omeprazole 40 mg once a day and I will see her back in a total of 8 weeks from today. This is a very pleasant 78-year-old autistic mentally challenged frail-appearing female who is in a good mood today. Her medications are pretty much on point and she is doing well. I do not have any labs to go over but they were ordered last visit. Her tinea under her left armpit is completely resolved but now she has some in the right armpit and caretaker would like a as needed order for Lotrisone to the region when needed. Cough  Pertinent negatives include no chest pain, chills, fever, headaches, myalgias, rash, shortness of breath or wheezing. CHRONIC CONDITION:        Depression/Anxiety: Stable depression with generalized anxiety. Mild in intensity but well controlled on  *  LORazepam (ATIVAN) 0.5 MG tablet, Take 0.5 tablets by mouth daily as needed for Anxiety for up to 30 days.  Take 1/2 tablet PO daily at 4 pm., Disp: 15 tablet, Rfl: 5  Acetaminophen Childrens 160 MG/5ML SOLN, TAKE 2 TEASPOONSFUL (10ML) BY MOUTH EVERY FOUR HOURS AS NEEDED FOR ELEVATED TEMPERATURE OVER 100 DEGREES., Disp: 473 mL, Rfl: 5  ibuprofen (ADVIL;MOTRIN) 200 MG tablet, Take 1 tablet by mouth every 8 hours as needed for Pain NO MORE THAN 6 PER DAY, Disp: 120 tablet, Rfl: 5  acetaminophen (TYLENOL) 160 MG/5ML suspension, Take 11.47 mLs by mouth every 4 hours as needed for Fever Elevated temperature over 100 degrees, Disp: 240 mL, Rfl: 3uth daily, Disp: 30 tablet, Rfl: 5  citalopram (CELEXA) 40 MG tablet, Take 1 tablet by mouth daily, Disp: 30 tablet, Rfl: 5 , without symptoms, no weight gain, no increase in anxiety, no suicidal or homicidal ideation's no unexplained fatigue, or relationship difficulties relayed this visit. Review of Systems   Constitutional: Negative for activity change, chills, diaphoresis, fatigue, fever and unexpected weight change. HENT: Negative for trouble swallowing and voice change. Eyes: Negative for visual disturbance. Respiratory: Positive for cough. Negative for chest tightness, shortness of breath and wheezing. Cardiovascular: Negative for chest pain, palpitations and leg swelling. Gastrointestinal: Negative for abdominal pain, blood in stool, constipation, diarrhea, nausea and vomiting. Endocrine: Negative for polydipsia, polyphagia and polyuria. Genitourinary: Negative for dysuria, enuresis, frequency and hematuria. Musculoskeletal: Positive for gait problem. Negative for arthralgias, back pain, joint swelling, myalgias and neck stiffness. Skin: Negative for rash. Neurological: Negative for dizziness, seizures, syncope, facial asymmetry, weakness, light-headedness, numbness and headaches. Hematological: Does not bruise/bleed easily. Psychiatric/Behavioral: Negative for behavioral problems, confusion, hallucinations and suicidal ideas. The patient is not nervous/anxious. Current Outpatient Medications:     cetirizine (ZYRTEC) 10 MG tablet, Take 1 tablet by mouth daily, Disp: 30 tablet, Rfl: 0    STOOL SOFTENER 100 MG capsule, TAKE 1 CAPSULE BY MOUTH TWICE A DAY, Disp: 62 capsule, Rfl: 5    LORazepam (ATIVAN) 0.5 MG tablet, Take 0.5 tablets by mouth daily for 96 days.  At 4 pm. (c/d sheet with card), Disp: 16 tablet, Rfl: 2   ESTARYLLA 0.25-35 MG-MCG per tablet, TAKE 1 TABLET BY MOUTH DAILY, Disp: 28 tablet, Rfl: 5    STOMACH RELIEF 525 MG/30ML suspension, TAKE 2 TABLESPOONSFUL (30ML) BY MOUTH EVERY 4 HOURS AS NEEDED FOR UPSET STOMACH OR DIARRHEA, Disp: 236 mL, Rfl: 5    citalopram (CELEXA) 40 MG tablet, TAKE 1 TABLET BY MOUTH ONCE DAILY. , Disp: 31 tablet, Rfl: 5    naproxen sodium (ANAPROX) 220 MG tablet, Take 1 tablet by mouth daily, Disp: 30 tablet, Rfl: 5    CRANBERRY CONCENTRATE 500 MG CAPS, TAKE 1 CAPSULE BY MOUTH TWICE A DAY, Disp: 62 capsule, Rfl: 5    ibuprofen (ADVIL;MOTRIN) 200 MG tablet, Take 1 tablet by mouth every 8 hours as needed for Pain NO MORE THAN 6 PER DAY, Disp: 120 tablet, Rfl: 5    acetaminophen (TYLENOL) 325 MG tablet, Take 1 tablet by mouth every 6 hours as needed for Fever, Disp: 120 tablet, Rfl: 5    No Known Allergies    Vitals:    02/11/22 0952   BP: 120/72   Site: Right Upper Arm   Position: Sitting   Cuff Size: Medium Adult   Pulse: 85   Resp: 19   Temp: 98.6 °F (37 °C)   TempSrc: Temporal   SpO2: 99%   Weight: 85 lb 9.6 oz (38.8 kg)   Height: 4' 6\" (1.372 m)       Wt Readings from Last 3 Encounters:   02/11/22 85 lb 9.6 oz (38.8 kg)   04/26/21 81 lb (36.7 kg)   03/05/21 79 lb (35.8 kg)       BP Readings from Last 3 Encounters:   02/11/22 120/72   04/26/21 98/60   03/05/21 105/68       No results found for this visit on 02/11/22. Past Medical History:   Diagnosis Date    Autistic disorder     Mental deficiency      No past surgical history on file. No family history on file.   Social History     Socioeconomic History    Marital status: Single     Spouse name: Not on file    Number of children: Not on file    Years of education: Not on file    Highest education level: Not on file   Occupational History    Not on file   Tobacco Use    Smoking status: Never Smoker    Smokeless tobacco: Never Used   Substance and Sexual Activity    Alcohol use: Not on file     Comment: NON DRINKER    Drug use: Not on file    Sexual activity: Not on file   Other Topics Concern    Not on file   Social History Narrative    Not on file     Social Determinants of Health     Financial Resource Strain: Low Risk     Difficulty of Paying Living Expenses: Not hard at all   Food Insecurity: No Food Insecurity    Worried About Running Out of Food in the Last Year: Never true    Boyd Sacalex in the Last Year: Never true   Transportation Needs:     Lack of Transportation (Medical): Not on file    Lack of Transportation (Non-Medical): Not on file   Physical Activity:     Days of Exercise per Week: Not on file    Minutes of Exercise per Session: Not on file   Stress:     Feeling of Stress : Not on file   Social Connections:     Frequency of Communication with Friends and Family: Not on file    Frequency of Social Gatherings with Friends and Family: Not on file    Attends Jain Services: Not on file    Active Member of [address] [place] [address] [employer] or Organizations: Not on file    Attends Club or Organization Meetings: Not on file    Marital Status: Not on file   Intimate Partner Violence:     Fear of Current or Ex-Partner: Not on file    Emotionally Abused: Not on file    Physically Abused: Not on file    Sexually Abused: Not on file   Housing Stability:     Unable to Pay for Housing in the Last Year: Not on file    Number of [place] in the Last Year: Not on file    Unstable Housing in the Last Year: Not on file       Physical Exam  Constitutional:       Appearance: Normal appearance. HENT:      Head: Normocephalic. Nose: Nose normal.   Eyes:      Pupils: Pupils are equal, round, and reactive to light. Cardiovascular:      Rate and Rhythm: Normal rate and regular rhythm. Pulmonary:      Effort: Pulmonary effort is normal.   Abdominal:      General: Abdomen is flat. Palpations: Abdomen is soft. Musculoskeletal:         General: Normal range of motion. Skin:     General: Skin is warm and dry. Neurological:      General: No focal deficit present. Mental Status: She is alert. Mental status is at baseline. Psychiatric:         Mood and Affect: Mood normal.                        Seen By:  HAMLET Zee CNP  *NOTE: This report was transcribed using voice recognition software. Every effort was made to ensure accuracy; however, inadvertent computerized transcription errors may be present.

## 2022-02-17 RX ORDER — CETIRIZINE HYDROCHLORIDE 10 MG/1
10 TABLET ORAL DAILY
Qty: 31 TABLET | Refills: 5 | Status: SHIPPED
Start: 2022-02-17 | End: 2022-04-15 | Stop reason: SDUPTHER

## 2022-02-22 RX ORDER — EVENING PRIMROSE OIL 500 MG
CAPSULE ORAL
Qty: 186 CAPSULE | Refills: 1 | Status: SHIPPED
Start: 2022-02-22 | End: 2022-04-15 | Stop reason: SDUPTHER

## 2022-04-08 DIAGNOSIS — R05.3 CHRONIC COUGH: ICD-10-CM

## 2022-04-08 DIAGNOSIS — Z13.220 SCREENING, LIPID: ICD-10-CM

## 2022-04-08 DIAGNOSIS — Z13.29 SCREENING FOR THYROID DISORDER: ICD-10-CM

## 2022-04-08 DIAGNOSIS — Z79.899 LONG-TERM CURRENT USE OF ANXIOLYTIC MEDICATION: ICD-10-CM

## 2022-04-08 LAB
ALBUMIN SERPL-MCNC: 4.1 G/DL (ref 3.5–5.2)
ALP BLD-CCNC: 50 U/L (ref 35–104)
ALT SERPL-CCNC: 11 U/L (ref 0–32)
ANION GAP SERPL CALCULATED.3IONS-SCNC: 12 MMOL/L (ref 7–16)
AST SERPL-CCNC: 16 U/L (ref 0–31)
BACTERIA: ABNORMAL /HPF
BASOPHILS ABSOLUTE: 0.06 E9/L (ref 0–0.2)
BASOPHILS RELATIVE PERCENT: 0.7 % (ref 0–2)
BILIRUB SERPL-MCNC: 0.4 MG/DL (ref 0–1.2)
BILIRUBIN URINE: NEGATIVE
BLOOD, URINE: ABNORMAL
BUN BLDV-MCNC: 13 MG/DL (ref 6–20)
CALCIUM SERPL-MCNC: 9.3 MG/DL (ref 8.6–10.2)
CHLORIDE BLD-SCNC: 104 MMOL/L (ref 98–107)
CHOLESTEROL, TOTAL: 180 MG/DL (ref 0–199)
CLARITY: CLEAR
CO2: 23 MMOL/L (ref 22–29)
COLOR: YELLOW
CREAT SERPL-MCNC: 0.6 MG/DL (ref 0.5–1)
EOSINOPHILS ABSOLUTE: 0.13 E9/L (ref 0.05–0.5)
EOSINOPHILS RELATIVE PERCENT: 1.5 % (ref 0–6)
EPITHELIAL CELLS, UA: ABNORMAL /HPF
GFR AFRICAN AMERICAN: >60
GFR NON-AFRICAN AMERICAN: >60 ML/MIN/1.73
GLUCOSE FASTING: 80 MG/DL (ref 74–99)
GLUCOSE URINE: NEGATIVE MG/DL
HCT VFR BLD CALC: 38.6 % (ref 34–48)
HDLC SERPL-MCNC: 73 MG/DL
HEMOGLOBIN: 12 G/DL (ref 11.5–15.5)
IMMATURE GRANULOCYTES #: 0.03 E9/L
IMMATURE GRANULOCYTES %: 0.3 % (ref 0–5)
KETONES, URINE: NEGATIVE MG/DL
LDL CHOLESTEROL CALCULATED: 94 MG/DL (ref 0–99)
LEUKOCYTE ESTERASE, URINE: ABNORMAL
LYMPHOCYTES ABSOLUTE: 1.66 E9/L (ref 1.5–4)
LYMPHOCYTES RELATIVE PERCENT: 18.9 % (ref 20–42)
MCH RBC QN AUTO: 23.6 PG (ref 26–35)
MCHC RBC AUTO-ENTMCNC: 31.1 % (ref 32–34.5)
MCV RBC AUTO: 76 FL (ref 80–99.9)
MONOCYTES ABSOLUTE: 0.62 E9/L (ref 0.1–0.95)
MONOCYTES RELATIVE PERCENT: 7.1 % (ref 2–12)
NEUTROPHILS ABSOLUTE: 6.28 E9/L (ref 1.8–7.3)
NEUTROPHILS RELATIVE PERCENT: 71.5 % (ref 43–80)
NITRITE, URINE: NEGATIVE
PDW BLD-RTO: 14.9 FL (ref 11.5–15)
PH UA: 6 (ref 5–9)
PLATELET # BLD: 302 E9/L (ref 130–450)
PMV BLD AUTO: 11.5 FL (ref 7–12)
POTASSIUM SERPL-SCNC: 3.9 MMOL/L (ref 3.5–5)
PROTEIN UA: NEGATIVE MG/DL
RBC # BLD: 5.08 E12/L (ref 3.5–5.5)
RBC UA: ABNORMAL /HPF (ref 0–2)
SODIUM BLD-SCNC: 139 MMOL/L (ref 132–146)
SPECIFIC GRAVITY UA: 1.02 (ref 1–1.03)
TOTAL PROTEIN: 7.6 G/DL (ref 6.4–8.3)
TRIGL SERPL-MCNC: 67 MG/DL (ref 0–149)
TSH SERPL DL<=0.05 MIU/L-ACNC: 1.95 UIU/ML (ref 0.27–4.2)
UROBILINOGEN, URINE: 0.2 E.U./DL
VLDLC SERPL CALC-MCNC: 13 MG/DL
WBC # BLD: 8.8 E9/L (ref 4.5–11.5)
WBC UA: ABNORMAL /HPF (ref 0–5)

## 2022-04-14 DIAGNOSIS — F41.9 ANXIETY: ICD-10-CM

## 2022-04-14 RX ORDER — LORAZEPAM 0.5 MG/1
0.25 TABLET ORAL DAILY
Qty: 16 TABLET | Refills: 2 | Status: SHIPPED
Start: 2022-04-14 | End: 2022-07-22 | Stop reason: SDUPTHER

## 2022-04-15 ENCOUNTER — OFFICE VISIT (OUTPATIENT)
Dept: PRIMARY CARE CLINIC | Age: 32
End: 2022-04-15
Payer: MEDICARE

## 2022-04-15 VITALS
WEIGHT: 88.8 LBS | BODY MASS INDEX: 20.55 KG/M2 | TEMPERATURE: 98.4 F | HEIGHT: 55 IN | RESPIRATION RATE: 20 BRPM | SYSTOLIC BLOOD PRESSURE: 118 MMHG | DIASTOLIC BLOOD PRESSURE: 80 MMHG | OXYGEN SATURATION: 97 % | HEART RATE: 74 BPM

## 2022-04-15 DIAGNOSIS — F79 MENTAL DEFICIENCY: ICD-10-CM

## 2022-04-15 DIAGNOSIS — M54.50 LOW BACK PAIN WITHOUT SCIATICA, UNSPECIFIED BACK PAIN LATERALITY, UNSPECIFIED CHRONICITY: ICD-10-CM

## 2022-04-15 DIAGNOSIS — N92.6 MENSTRUAL PERIODS IRREGULAR: ICD-10-CM

## 2022-04-15 DIAGNOSIS — Z79.899 LONG-TERM CURRENT USE OF ANXIOLYTIC MEDICATION: ICD-10-CM

## 2022-04-15 DIAGNOSIS — Z13.220 SCREENING, LIPID: ICD-10-CM

## 2022-04-15 DIAGNOSIS — Z13.29 SCREENING FOR THYROID DISORDER: ICD-10-CM

## 2022-04-15 DIAGNOSIS — M43.6 TORTICOLLIS: Primary | ICD-10-CM

## 2022-04-15 DIAGNOSIS — F84.0 AUTISTIC DISORDER: ICD-10-CM

## 2022-04-15 PROCEDURE — 99214 OFFICE O/P EST MOD 30 MIN: CPT | Performed by: NURSE PRACTITIONER

## 2022-04-15 PROCEDURE — G8427 DOCREV CUR MEDS BY ELIG CLIN: HCPCS | Performed by: NURSE PRACTITIONER

## 2022-04-15 PROCEDURE — 1036F TOBACCO NON-USER: CPT | Performed by: NURSE PRACTITIONER

## 2022-04-15 PROCEDURE — G8420 CALC BMI NORM PARAMETERS: HCPCS | Performed by: NURSE PRACTITIONER

## 2022-04-15 RX ORDER — ACETAMINOPHEN 325 MG/1
325 TABLET ORAL EVERY 6 HOURS PRN
Qty: 120 TABLET | Refills: 5 | Status: SHIPPED | OUTPATIENT
Start: 2022-04-15 | End: 2022-10-12

## 2022-04-15 RX ORDER — EVENING PRIMROSE OIL 500 MG
1 CAPSULE ORAL 2 TIMES DAILY
Qty: 62 CAPSULE | Refills: 5 | Status: SHIPPED
Start: 2022-04-15 | End: 2022-09-06

## 2022-04-15 RX ORDER — CITALOPRAM 40 MG/1
TABLET ORAL
Qty: 31 TABLET | Refills: 5 | Status: SHIPPED
Start: 2022-04-15 | End: 2022-11-01

## 2022-04-15 RX ORDER — CETIRIZINE HYDROCHLORIDE 10 MG/1
10 TABLET ORAL DAILY
Qty: 31 TABLET | Refills: 5 | Status: SHIPPED | OUTPATIENT
Start: 2022-04-15

## 2022-04-15 RX ORDER — NORGESTIMATE AND ETHINYL ESTRADIOL 0.25-0.035
1 KIT ORAL DAILY
Qty: 31 TABLET | Refills: 5 | Status: SHIPPED | OUTPATIENT
Start: 2022-04-15 | End: 2022-10-12

## 2022-04-15 RX ORDER — IBUPROFEN 200 MG
200 TABLET ORAL EVERY 8 HOURS PRN
Qty: 120 TABLET | Refills: 5 | Status: SHIPPED | OUTPATIENT
Start: 2022-04-15 | End: 2022-10-12

## 2022-04-15 RX ORDER — DOCUSATE SODIUM 100 MG/1
100 CAPSULE, LIQUID FILLED ORAL 2 TIMES DAILY
Qty: 62 CAPSULE | Refills: 5 | Status: SHIPPED | OUTPATIENT
Start: 2022-04-15 | End: 2022-05-16

## 2022-04-15 RX ORDER — NAPROXEN SODIUM 220 MG
220 TABLET ORAL DAILY
Qty: 31 TABLET | Refills: 5 | Status: SHIPPED | OUTPATIENT
Start: 2022-04-15 | End: 2022-10-12

## 2022-04-15 ASSESSMENT — PATIENT HEALTH QUESTIONNAIRE - PHQ9
SUM OF ALL RESPONSES TO PHQ9 QUESTIONS 1 & 2: 0
SUM OF ALL RESPONSES TO PHQ QUESTIONS 1-9: 0
SUM OF ALL RESPONSES TO PHQ QUESTIONS 1-9: 0
2. FEELING DOWN, DEPRESSED OR HOPELESS: 0
SUM OF ALL RESPONSES TO PHQ QUESTIONS 1-9: 0
1. LITTLE INTEREST OR PLEASURE IN DOING THINGS: 0
SUM OF ALL RESPONSES TO PHQ QUESTIONS 1-9: 0

## 2022-04-15 ASSESSMENT — ENCOUNTER SYMPTOMS
CONSTIPATION: 0
BLOOD IN STOOL: 0
COUGH: 0
SHORTNESS OF BREATH: 0
VOICE CHANGE: 0
NAUSEA: 0
BACK PAIN: 0
ABDOMINAL PAIN: 0
CHEST TIGHTNESS: 0
DIARRHEA: 0
TROUBLE SWALLOWING: 0
WHEEZING: 0
VOMITING: 0

## 2022-04-15 NOTE — PATIENT INSTRUCTIONS
Patient Education        Abnormal Uterine Bleeding: Care Instructions  Overview     Abnormal uterine bleeding is irregular bleeding from the uterus. It may be bleeding that is heavier, lighter, or lasts longer than your usual period. Or it may be bleeding that doesn't occur at your regular time. Sometimes it is caused by changes in hormone levels. It can also be caused by growths in theuterus, such as fibroids or polyps. Sometimes a cause cannot be found. You may have bleeding when you are not expecting your period. Your doctor maysuggest a pregnancy test.  Follow-up care is a key part of your treatment and safety. Be sure to make and go to all appointments, and call your doctor if you are having problems. It's also a good idea to know your test results and keep alist of the medicines you take. How can you care for yourself at home?  Be safe with medicines. Take pain medicines exactly as directed. ? If the doctor gave you a prescription medicine for pain, take it as prescribed. ? If you are not taking a prescription pain medicine, ask your doctor if you can take an over-the-counter medicine.  You may be low in iron because of blood loss. Eat a balanced diet that is high in iron and vitamin C. Foods rich in iron include red meat, shellfish, eggs, beans, and leafy green vegetables. Talk to your doctor about whether you need to take iron pills or a multivitamin. When should you call for help? Call 911 anytime you think you may need emergency care. For example, call if:     You passed out (lost consciousness). Call your doctor now or seek immediate medical care if:     You have new or worse belly or pelvic pain.      You have severe vaginal bleeding.      You feel dizzy or lightheaded, or you feel like you may faint.    Watch closely for changes in your health, and be sure to contact your doctor if:     You think you may be pregnant.      Your bleeding gets worse.      You do not get better as expected. Where can you learn more? Go to [email or web address] org and sign in to your [email or web address] account. Enter V321 in the FitWithMe box to learn more about \"Abnormal Uterine Bleeding: Care Instructions. \"     If you do not have an account, please click on the \"Sign Up Now\" link. Current as of: November 22, 2021               Content Version: 13.2  © 2006-2022 [employer]. Care instructions adapted under license by [facility] ([facility]). If you have questions about a medical condition or this instruction, always ask your healthcare professional. [place] [address] any warranty or liability for your use of this information. Patient Education        Body Mass Index: Care Instructions  Your Care Instructions     Body mass index (BMI) can help you see if your weight is raising your risk for health problems. It uses a formula to compare how much you weigh with how tallyou are.  A BMI lower than 18.5 is considered underweight.  A BMI between 18.5 and 24.9 is considered healthy.  A BMI between 25 and 29.9 is considered overweight. A BMI of 30 or higher is considered obese. If your BMI is in the normal range, it means that you have a lower risk for weight-related health problems. If your BMI is in the overweight or obese range, you may be at increased risk for weight-related health problems, such as high blood pressure, heart disease, stroke, arthritis or joint pain, and diabetes. If your BMI is in the underweight range, you may be at increased risk for health problems such as fatigue, lower protection (immunity) againstillness, muscle loss, bone loss, hair loss, and hormone problems. BMI is just one measure of your risk for weight-related health problems. You may be at higher risk for health problems if you are not active, you eat anunhealthy diet, or you drink too much alcohol or use tobacco products.   Follow-up care is a key part of your treatment and safety. Be sure to make and go to all appointments, and call your doctor if you are having problems. It's also a good idea to know your test results and keep alist of the medicines you take. How can you care for yourself at home?  Practice healthy eating habits. This includes eating plenty of fruits, vegetables, whole grains, lean protein, and low-fat dairy.  If your doctor recommends it, get more exercise. Walking is a good choice. Bit by bit, increase the amount you walk every day. Try for at least 30 minutes on most days of the week.  Do not smoke. Smoking can increase your risk for health problems. If you need help quitting, talk to your doctor about stop-smoking programs and medicines. These can increase your chances of quitting for good.  Limit alcohol to 2 drinks a day for men and 1 drink a day for women. Too much alcohol can cause health problems. If you have a BMI higher than 25   Your doctor may do other tests to check your risk for weight-related health problems. This may include measuring the distance around your waist. A waist measurement of more than 40 inches in men or 35 inches in women can increase the risk of weight-related health problems.  Talk with your doctor about steps you can take to stay healthy or improve your health. You may need to make lifestyle changes to lose weight and stay healthy, such as changing your diet and getting regular exercise. If you have a BMI lower than 18.5   Your doctor may do other tests to check your risk for health problems.  Talk with your doctor about steps you can take to stay healthy or improve your health. You may need to make lifestyle changes to gain or maintain weight and stay healthy, such as getting more healthy foods in your diet and doing exercises to build muscle. Where can you learn more? Go to https://[email or web address].health-[email or web address] org and sign in to your Truly Accomplished account.  Enter S176 in the Raidarrr box to learn more about \"Body Mass Index: Care Instructions. \"     If you do not have an account, please click on the \"Sign Up Now\" link. Current as of: December 27, 2021               Content Version: 13.2  © 3111-6810 Healthwise, Incorporated. Care instructions adapted under license by [facility] ([facility]). If you have questions about a medical condition or this instruction, always ask your healthcare professional. [address] any warranty or liability for your use of this information.

## 2022-04-15 NOTE — PROGRESS NOTES
Adri Cullen : 1990 Sex: female  Age: 33 y.o. Chief Complaint   Patient presents with    Other         ASSESSMENT/PLAN:    1. Torticollis  2. Low back pain without sciatica, unspecified back pain laterality, unspecified chronicity  -     ibuprofen (ADVIL;MOTRIN) 200 MG tablet; Take 1 tablet by mouth every 8 hours as needed for Pain NO MORE THAN 6 PER DAY, Disp-120 tablet, R-5Normal  3. Long-term current use of anxiolytic medication  -     CBC with Auto Differential; Future  -     Comprehensive Metabolic Panel, Fasting; Future  -     Urinalysis; Future  4. Mental deficiency  5. Autistic disorder  6. Screening, lipid  -     Lipid Panel; Future  7. Screening for thyroid disorder  -     TSH; Future  8. BMI 21.0-21.9, adult  9. Menstrual periods irregular  -     Vania Triana APRN-CNP, Primary Care, [facility]      Return in about 6 months (around 10/15/2022). PLAN MOVING FORWARD:  We will get fasting labs on the patient    Follow-up in 4 months for evaluation  On this date 3/5/2021 I have spent 40 minutes reviewing previous notes, test results and face to face with the patient discussing the diagnosis and importance of compliance with the treatment plan as well as documenting on the day of the visit. Educational materials printed for patient's review and were included in patient instructions on her After Visit Summary and given to patient at the end of visit. Counseled regarding above diagnosis, including possible risks and complications,  especially if left uncontrolled. Counseled regarding the possible side effects, risks, benefits and alternatives to treatment; patient verbalizes understanding, agrees, feels comfortable with and wishes to proceed with above plan moving forward. Advised patient to call with any new medication issues, and read all Rx info from pharmacy to assure aware of all possible risks and side effects of medication before taking.      Reviewed age and gender appropriate health screening exams and vaccinations. Advised patient regarding importance of keeping up with recommended health maintenance and to schedule as soon as possible if overdue, as this is important in assessing for undiagnosed pathology, especially cancer, as well as protecting against potentially harmful/life threatening disease. All questions answered. SUBJECTIVE/OBJECTIVE:    Corey Stock  presents today for evaluation and management of chronic medical problems. Current medication list reviewed. The patient is tolerating all medications well without adverse events or known side effects. The patient does understand the risk and benefits of the prescribed medications. The patient is not up-to-date on all age-appropriate wellness issues. Patient denies any reccent hospitalizations or ER visit. No Acute Complaints reported:      Pt is very tearful in the office today and it is related to her periods. Her Periods are not doing well is very emotional and would benefit from an evaluation and possible change to her OCP. LAST  VISIT  This is a very pleasant 64-year-old autistic mentally challenged frail-appearing female who is in a good mood today. Her medications are pretty much on point and she is doing well. I do not have any labs to go over but they were ordered last visit. Her tinea under her left armpit is completely resolved but now she has some in the right armpit and caretaker would like a as needed order for Lotrisone to the region when needed. Other  Pertinent negatives include no abdominal pain, arthralgias, chest pain, chills, coughing, diaphoresis, fatigue, fever, headaches, joint swelling, myalgias, nausea, numbness, rash, vomiting or weakness. CHRONIC CONDITION:      Depression/Anxiety: Stable depression with generalized anxiety. Mild in intensity but well controlled on    citalopram (CELEXA) 40 MG tablet, TAKE 1 TABLET BY MOUTH ONCE DAILY. , Disp: 31 tablet, Rfl: 5  LORazepam (ATIVAN) 0.5 MG tablet, Take 0.5 tablets by mouth daily for 96 days. At 4 pm. (c/d sheet with card), Disp: 16 tablet, Rfl: 2, without symptoms, no weight gain, no increase in anxiety, no suicidal or homicidal ideation's no unexplained fatigue, or relationship difficulties relayed this visit. Review of Systems   Constitutional: Negative for activity change, chills, diaphoresis, fatigue, fever and unexpected weight change. HENT: Negative for trouble swallowing and voice change. Eyes: Negative for visual disturbance. Respiratory: Negative for cough, chest tightness, shortness of breath and wheezing. Cardiovascular: Negative for chest pain, palpitations and leg swelling. Gastrointestinal: Negative for abdominal pain, blood in stool, constipation, diarrhea, nausea and vomiting. Endocrine: Negative for polydipsia, polyphagia and polyuria. Genitourinary: Negative for dysuria, enuresis, frequency and hematuria. Musculoskeletal: Positive for gait problem. Negative for arthralgias, back pain, joint swelling, myalgias and neck stiffness. Skin: Negative for rash. Neurological: Negative for dizziness, seizures, syncope, facial asymmetry, weakness, light-headedness, numbness and headaches. Hematological: Does not bruise/bleed easily. Psychiatric/Behavioral: Negative for behavioral problems, confusion, hallucinations and suicidal ideas. The patient is not nervous/anxious. Current Outpatient Medications:     acetaminophen (TYLENOL) 325 MG tablet, Take 1 tablet by mouth every 6 hours as needed for Fever, Disp: 120 tablet, Rfl: 5    cetirizine (ZYRTEC) 10 MG tablet, Take 1 tablet by mouth daily, Disp: 31 tablet, Rfl: 5    citalopram (CELEXA) 40 MG tablet, TAKE 1 TABLET BY MOUTH ONCE DAILY. , Disp: 31 tablet, Rfl: 5    Cranberry (CRANBERRY CONCENTRATE) 500 MG CAPS, Take 1 capsule by mouth 2 times daily, Disp: 62 capsule, Rfl: 5    ESTARYLLA 0.25-35 MG-MCG per tablet, Take 1 tablet by mouth daily, Disp: 31 tablet, Rfl: 5    ibuprofen (ADVIL;MOTRIN) 200 MG tablet, Take 1 tablet by mouth every 8 hours as needed for Pain NO MORE THAN 6 PER DAY, Disp: 120 tablet, Rfl: 5    naproxen sodium (ANAPROX) 220 MG tablet, Take 1 tablet by mouth daily, Disp: 31 tablet, Rfl: 5    bismuth subsalicylate (STOMACH RELIEF) 262 MG/15ML suspension, Take 15 mLs by mouth as needed for Indigestion, Disp: 236 mL, Rfl: 5    docusate sodium (STOOL SOFTENER) 100 MG capsule, Take 1 capsule by mouth 2 times daily, Disp: 62 capsule, Rfl: 5    LORazepam (ATIVAN) 0.5 MG tablet, Take 0.5 tablets by mouth daily for 96 days. At 4 pm. (c/d sheet with card), Disp: 16 tablet, Rfl: 2    No Known Allergies    Vitals:    04/15/22 0946   BP: 118/80   Pulse: 74   Resp: 20   Temp: 98.4 °F (36.9 °C)   SpO2: 97%   Weight: 88 lb 12.8 oz (40.3 kg)   Height: 4' 6\" (1.372 m)       Wt Readings from Last 3 Encounters:   04/15/22 88 lb 12.8 oz (40.3 kg)   02/11/22 85 lb 9.6 oz (38.8 kg)   04/26/21 81 lb (36.7 kg)       BP Readings from Last 3 Encounters:   04/15/22 118/80   02/11/22 120/72   04/26/21 98/60       No results found for this visit on 04/15/22. Past Medical History:   Diagnosis Date    Autistic disorder     Mental deficiency      History reviewed. No pertinent surgical history. History reviewed. No pertinent family history.   Social History     Socioeconomic History    Marital status: Single     Spouse name: Not on file    Number of children: Not on file    Years of education: Not on file    Highest education level: Not on file   Occupational History    Not on file   Tobacco Use    Smoking status: Never Smoker    Smokeless tobacco: Never Used   Substance and Sexual Activity    Alcohol use: Not on file     Comment: NON DRINKER    Drug use: Not on file    Sexual activity: Not on file   Other Topics Concern    Not on file   Social History Narrative    Not on file     Social Determinants of Health     Financial Resource Strain: Low Risk     Difficulty of Paying Living Expenses: Not hard at all   Food Insecurity: No Food Insecurity    Worried About Running Out of Food in the Last Year: Never true    Annie of Food in the Last Year: Never true   Transportation Needs:     Lack of Transportation (Medical): Not on file    Lack of Transportation (Non-Medical): Not on file   Physical Activity:     Days of Exercise per Week: Not on file    Minutes of Exercise per Session: Not on file   Stress:     Feeling of Stress : Not on file   Social Connections:     Frequency of Communication with Friends and Family: Not on file    Frequency of Social Gatherings with Friends and Family: Not on file    Attends [employer] Services: Not on file    Active Member of [address] [place] [address] [employer] or Organizations: Not on file    Attends Club or Organization Meetings: Not on file    Marital Status: Not on file   Intimate Partner Violence:     Fear of Current or Ex-Partner: Not on file    Emotionally Abused: Not on file    Physically Abused: Not on file    Sexually Abused: Not on file   Housing Stability:     Unable to Pay for Housing in the Last Year: Not on file    Number of [place] in the Last Year: Not on file    Unstable Housing in the Last Year: Not on file       Physical Exam  Constitutional:       Appearance: Normal appearance. HENT:      Head: Normocephalic. Nose: Nose normal.   Eyes:      Pupils: Pupils are equal, round, and reactive to light. Cardiovascular:      Rate and Rhythm: Normal rate and regular rhythm. Pulmonary:      Effort: Pulmonary effort is normal.   Abdominal:      General: Abdomen is flat. Palpations: Abdomen is soft. Musculoskeletal:         General: Normal range of motion. Skin:     General: Skin is warm and dry. Neurological:      General: No focal deficit present. Mental Status: She is alert. Mental status is at baseline.    Psychiatric:         Mood and Affect: Mood normal.                        Seen By:  Abbi Granger, APRN - CNP  *NOTE: This report was transcribed using voice recognition software. Every effort was made to ensure accuracy; however, inadvertent computerized transcription errors may be present.

## 2022-04-18 ENCOUNTER — OFFICE VISIT (OUTPATIENT)
Dept: PRIMARY CARE CLINIC | Age: 32
End: 2022-04-18
Payer: MEDICARE

## 2022-04-18 VITALS
HEIGHT: 55 IN | DIASTOLIC BLOOD PRESSURE: 90 MMHG | SYSTOLIC BLOOD PRESSURE: 140 MMHG | WEIGHT: 92.5 LBS | BODY MASS INDEX: 21.41 KG/M2 | OXYGEN SATURATION: 97 % | HEART RATE: 90 BPM | TEMPERATURE: 98.4 F

## 2022-04-18 DIAGNOSIS — M43.6 TORTICOLLIS: Primary | ICD-10-CM

## 2022-04-18 DIAGNOSIS — Z00.00 ROUTINE GENERAL MEDICAL EXAMINATION AT A HEALTH CARE FACILITY: ICD-10-CM

## 2022-04-18 DIAGNOSIS — F84.0 AUTISTIC DISORDER: ICD-10-CM

## 2022-04-18 DIAGNOSIS — F79 MENTAL DEFICIENCY: ICD-10-CM

## 2022-04-18 DIAGNOSIS — Z79.899 LONG-TERM CURRENT USE OF ANXIOLYTIC MEDICATION: ICD-10-CM

## 2022-04-18 PROCEDURE — 1036F TOBACCO NON-USER: CPT | Performed by: NURSE PRACTITIONER

## 2022-04-18 PROCEDURE — G8427 DOCREV CUR MEDS BY ELIG CLIN: HCPCS | Performed by: NURSE PRACTITIONER

## 2022-04-18 PROCEDURE — G8420 CALC BMI NORM PARAMETERS: HCPCS | Performed by: NURSE PRACTITIONER

## 2022-04-18 PROCEDURE — 99213 OFFICE O/P EST LOW 20 MIN: CPT | Performed by: NURSE PRACTITIONER

## 2022-04-18 ASSESSMENT — ENCOUNTER SYMPTOMS
ABDOMINAL PAIN: 0
COLOR CHANGE: 0
BLOOD IN STOOL: 0
ANAL BLEEDING: 0
RECTAL PAIN: 0

## 2022-04-18 NOTE — PROGRESS NOTES
Andressa Henry (:  1990) is a 33 y.o. female,Established patient, here for evaluation of the following chief complaint(s): Annual Exam (pe for special olympics)         ASSESSMENT/PLAN:  1. Torticollis  2. Long-term current use of anxiolytic medication  3. Mental deficiency  4. Autistic disorder  5. Routine general medical examination at a health care facility      No follow-ups on file. Subjective   SUBJECTIVE/OBJECTIVE:  This is a very pleasant developmentally delayed patient who I went to there home/workshop to do a physical exam for clearance for [employer]. Patient had no complaints at this time. Vitals, care gaps and medical history was reviewed. Also recent labs and diagnostics for any possible complications with [employer] interest.      Review of Systems   Constitutional: Negative for activity change and fever. HENT: Negative for nosebleeds. Cardiovascular: Negative for chest pain, palpitations and leg swelling. Gastrointestinal: Negative for abdominal pain, anal bleeding, blood in stool and rectal pain. Genitourinary: Negative for hematuria. Skin: Negative for color change and pallor. Neurological: Negative for light-headedness and headaches. Hematological: Does not bruise/bleed easily. Objective   Physical Exam  Constitutional:       Appearance: Normal appearance. HENT:      Head: Normocephalic. Nose: Nose normal.   Eyes:      Pupils: Pupils are equal, round, and reactive to light. Cardiovascular:      Rate and Rhythm: Normal rate and regular rhythm. Pulmonary:      Effort: Pulmonary effort is normal.   Abdominal:      General: Abdomen is flat. Palpations: Abdomen is soft. Musculoskeletal:         General: Normal range of motion. Skin:     General: Skin is warm and dry. Neurological:      General: No focal deficit present. Mental Status: She is alert. Mental status is at baseline.    Psychiatric:         Mood and Affect: Mood normal.     Please see the scanned in physical exam       On this date 4/18/2022 I have spent 15 minutes reviewing previous notes, test results and face to face with the patient discussing the diagnosis and importance of compliance with the treatment plan as well as documenting on the day of the visit. An electronic signature was used to authenticate this note.   Electronically signed by Denese Lesches, APRN - CNP on 4/18/2022 at 3:24 PM       --Denese Lesches, APRN - CNP

## 2022-05-03 RX ORDER — CLOTRIMAZOLE AND BETAMETHASONE DIPROPIONATE 10; .64 MG/G; MG/G
CREAM TOPICAL
Qty: 2 EACH | Refills: 1 | Status: SHIPPED | OUTPATIENT
Start: 2022-05-03

## 2022-05-03 NOTE — TELEPHONE ENCOUNTER
Patients last appointment 4/18/2022. Patients next scheduled appointment   Future Appointments   Date Time Provider Naeem Puente   4/21/2023  9:30 AM HAMLET Malagon CNP [facility]      She is having the rash underneath the arms again, so Cash Membreno is requesting a refill on this.

## 2022-07-13 ENCOUNTER — TELEPHONE (OUTPATIENT)
Dept: PRIMARY CARE CLINIC | Age: 32
End: 2022-07-13

## 2022-07-13 DIAGNOSIS — R21 RASH: Primary | ICD-10-CM

## 2022-07-13 NOTE — TELEPHONE ENCOUNTER
She needs a referral to be seen by Dr. Sophie Bernal, does she needs an appt for that or can you send the referral? Let me know. Pending just in case. Thanks.

## 2022-07-14 NOTE — TELEPHONE ENCOUNTER
[address], I made a mistake, the provider was Dr. Piedad Bustillo for the referral to dermatology, Dr. Shon Sorensen is booking for next year. Pending the referral again.

## 2022-07-21 DIAGNOSIS — F41.9 ANXIETY: ICD-10-CM

## 2022-07-21 NOTE — TELEPHONE ENCOUNTER
Chief Complaint   Patient presents with   • Follow-up     for cardiac management, denies any chest pain, palpitations, shortness of breath.    • labs     has had recent labs  within 2 months done by Dr. Iniguez.    • Med Refill     requesting refills on cardiac medications, 90 day supply.        Cardiac Complaints  none      Subjective   Jaki Camacho is a 74 y.o. female with HTN, palpitations, BBB, and hyperlipidemia, who was referred for cardiac evaluation in May 2019 secondary to chest tightness and dyspnea on exertion. She also reported occasional self-limiting palpitations occurring 2-3 weekly lasting a few seconds. She had an EKG which showed LBBB. Echo done by her PCP showed possible septal wall motion abnormality and was referred for further evaluation. Blood pressure was mildly elevated at consult, amlodipine 5 mg added. Stress test was done to rule out ischemia. Lexiscan on 5/29/19 showed normal LVEF with changes in the septum felt to be related to the BBB but could not rule out infarct. No ischemia was seen. Medical management recommended unless she developed more symptoms, then cardiac cath would be recommended.  She then called in March 2020 with more palpitations, toprol XL added for management. Holter done in April 2020 showed rare PVCs with 2 short runs of SVT, toprol XL continued at current.     Patient comes today for follow up and denies any new concerns. No cardiac concerns voiced. Chest pain, SOA, palpitations, dizziness, and syncope denied. She states palpitations have been well managed off of metoprolol therapy.  Labs done about 2 months ago, no current available. Refills requested. She does have stress because she has been caring for her  at home.       Cardiac History  Past Surgical History:   Procedure Laterality Date   • CARDIOVASCULAR STRESS TEST  05/29/2019    L.Cardiolite- EF 69%. Septal Infarct Vs LBBB.   • CONVERTED (HISTORICAL) HOLTER  04/19/2020    NSR, few PVC, short  Patients last appointment 4/18/2022.   Patients next scheduled appointment   Future Appointments   Date Time Provider Naeem Puente   4/21/2023  9:30 AM HAMLET Blanchard - CNP [facility] runs of SVT   • ECHO - CONVERTED  2019    @ [facility]. EF 65%. ? Septal WMA. AO- 3.7 Cm. RVSP- 38 mmHg.       Current Outpatient Medications   Medication Sig Dispense Refill   • amLODIPine (NORVASC) 5 MG tablet Take 1 tablet by mouth Daily. 90 tablet 2   • aspirin 81 MG EC tablet Take 81 mg by mouth Daily.     • Aspirin Effervescent (KADE-SELTZER PO) Take  by mouth 2 (Two) Times a Day As Needed.     • celecoxib (CeleBREX) 50 MG capsule Take 50 mg by mouth Daily.     • esomeprazole (nexIUM) 40 MG capsule Take 40 mg by mouth Every Morning Before Breakfast.     • lisinopril-hydrochlorothiazide (PRINZIDE,ZESTORETIC) 20-25 MG per tablet Take 1 tablet by mouth Daily. 90 tablet 2   • metFORMIN (GLUCOPHAGE) 500 MG tablet Take 500 mg by mouth 2 (Two) Times a Day With Meals.     • PARoxetine (PAXIL) 20 MG tablet Take 20 mg by mouth Every Morning.     • pravastatin (PRAVACHOL) 10 MG tablet Take 1 tablet by mouth Daily. 90 tablet 2   • vitamin D (ERGOCALCIFEROL) 94335 units capsule capsule Take 50,000 Units by mouth 1 (One) Time Per Week.       No current facility-administered medications for this visit.       Patient has no known allergies.    Past Medical History:   Diagnosis Date   • Abnormal ECG    • GERD (gastroesophageal reflux disease)    • History of partial thyroidectomy    • Hyperlipidemia    • Hypertension    • Osteoarthritis    • Vitamin D deficiency        Social History     Socioeconomic History   • Marital status:      Spouse name: Not on file   • Number of children: Not on file   • Years of education: Not on file   • Highest education level: Not on file   Tobacco Use   • Smoking status: Never Smoker   • Smokeless tobacco: Never Used   Vaping Use   • Vaping Use: Never used   Substance and Sexual Activity   • Alcohol use: No   • Drug use: No       Family History   Problem Relation Age of Onset   • Heart attack Mother          at 80 with MI   • Heart failure Mother    • Heart attack Father           "at 82 with MI   • Heart failure Father    • Valvular heart disease Brother         valve replacement at 67 years old   • Heart disease Brother         CABG    • Cancer Brother    • No Known Problems Daughter        Review of Systems   Constitutional: Negative for malaise/fatigue and night sweats.   Cardiovascular: Negative for chest pain, claudication, dyspnea on exertion, irregular heartbeat, leg swelling, near-syncope, orthopnea, palpitations and syncope.   Respiratory: Negative for cough, shortness of breath and wheezing.    Musculoskeletal: Negative for back pain, joint pain, joint swelling and stiffness.   Gastrointestinal: Negative for anorexia, heartburn, melena, nausea and vomiting.   Genitourinary: Negative for dysuria, hematuria, hesitancy and nocturia.   Neurological: Negative for dizziness, light-headedness and loss of balance.   Psychiatric/Behavioral: Negative for depression and memory loss. The patient is not nervous/anxious.            Objective     /64 (BP Location: Left arm, Patient Position: Sitting)   Pulse 64   Ht 162.6 cm (64.02\")   Wt 80.3 kg (177 lb)   BMI 30.37 kg/m²     Constitutional:       Appearance: Healthy appearance. Not in distress.   Eyes:      Pupils: Pupils are equal, round, and reactive to light.   HENT:      Nose: Nose normal.   Pulmonary:      Effort: Pulmonary effort is normal.      Breath sounds: Normal breath sounds.   Cardiovascular:      PMI at left midclavicular line. Normal rate. Regular rhythm.      Murmurs: There is a systolic murmur.   Abdominal:      Palpations: Abdomen is soft.   Musculoskeletal: Normal range of motion.      Cervical back: Normal range of motion and neck supple. Skin:     General: Skin is warm and dry.   Neurological:      Mental Status: Oriented to person, place and time.         Procedures    Assessment/Plan     Palpitations:  Not on medication therapy with toprol. She states palpitations denied without BB therapy.  Limited caffeine " advised.  If palpitations should worsen, she will call for medication changes, limited caffeine advised.      HTN:  Blood pressure stable. No changes to current zestoretic/norvasc therapy recommended. Limited sodium advised.     Hyperlipidemia:  On statin therapy with pravachol. Patient tolerates well. Current continued. FLP followed by your office. Can we have for review?     Cardiac status:  On ASA therapy without concerns. Bleeding and bruising are denied. Same continued. No repeat workup recommended as status stable.      DM:  Taking glucophage therapy.  Patient tolerates well. AIC followed by your office. Can we have for review?  She admits recently high and was advised to limit sugar/carb intake in regards.      BMI noted at 30.37. Patient urged to limit sugar/carbs in regards. Walking regimen advised.      Refills per request.     6 month follow up recommended or sooner if needed.       Problems Addressed this Visit        Cardiac and Vasculature    Palpitations - Primary    Hypercholesteremia    Relevant Medications    pravastatin (PRAVACHOL) 10 MG tablet    Essential hypertension    Relevant Medications    amLODIPine (NORVASC) 5 MG tablet    lisinopril-hydrochlorothiazide (PRINZIDE,ZESTORETIC) 20-25 MG per tablet      Other Visit Diagnoses     Type 2 diabetes mellitus without complication, without long-term current use of insulin (CMS/[facility])        Obesity (BMI 30.0-34.9)          Diagnoses       Codes Comments    Palpitations    -  Primary ICD-10-CM: R00.2  ICD-9-CM: 785.1     Essential hypertension     ICD-10-CM: I10  ICD-9-CM: 401.9     Hypercholesteremia     ICD-10-CM: E78.00  ICD-9-CM: 272.0     Type 2 diabetes mellitus without complication, without long-term current use of insulin (CMS/[facility])     ICD-10-CM: E11.9  ICD-9-CM: 250.00     Obesity (BMI 30.0-34.9)     ICD-10-CM: E66.9  ICD-9-CM: 278.00           Patient's Body mass index is 30.37 kg/m². indicating that she is obese. Good cardiac diet with  limited carbs, calories, and activity as tolerated advised.              Electronically signed by KAREN Carias July 14, 2021 12:07 EDT

## 2022-07-22 RX ORDER — LORAZEPAM 0.5 MG/1
0.25 TABLET ORAL DAILY
Qty: 16 TABLET | Refills: 2 | Status: SHIPPED
Start: 2022-07-22 | End: 2022-11-01

## 2022-08-02 ENCOUNTER — TELEPHONE (OUTPATIENT)
Dept: PRIMARY CARE CLINIC | Age: 32
End: 2022-08-02

## 2022-08-02 NOTE — TELEPHONE ENCOUNTER
Ever Lalit called requesting OT/PT orders and Speech evaluation orders. If pt needs appt, she told me to let her know. Thanks.

## 2022-08-23 ENCOUNTER — TELEPHONE (OUTPATIENT)
Dept: PRIMARY CARE CLINIC | Age: 32
End: 2022-08-23

## 2022-08-23 DIAGNOSIS — F41.9 ANXIETY: Primary | ICD-10-CM

## 2022-08-23 NOTE — TELEPHONE ENCOUNTER
Emelyn Lindsey from [facility] called concern about \"patient's obsession with music\", she does not want to leave the room for small walks which cause some agitation in between roommates and they are wondering if you ca prescribe something to control her anxiety or if a psych referral should be made. Please advise.

## 2022-08-24 NOTE — TELEPHONE ENCOUNTER
Psychiatrist they use is [facility] in [place] [address]. Phone Number is [phone]. Pending order for you. Sathish Herrera

## 2022-09-06 RX ORDER — EVENING PRIMROSE OIL 500 MG
CAPSULE ORAL
Qty: 62 CAPSULE | Refills: 2 | Status: SHIPPED | OUTPATIENT
Start: 2022-09-06

## 2022-09-06 NOTE — TELEPHONE ENCOUNTER
Patients last appointment 4/18/2022.   Patients next scheduled appointment   Future Appointments   Date Time Provider Naeem Puente   4/21/2023  9:30 AM HAMLET Sullivan - CNP [facility]

## 2022-10-12 ENCOUNTER — OFFICE VISIT (OUTPATIENT)
Dept: PRIMARY CARE CLINIC | Age: 32
End: 2022-10-12
Payer: MEDICARE

## 2022-10-12 VITALS
DIASTOLIC BLOOD PRESSURE: 72 MMHG | BODY MASS INDEX: 22.08 KG/M2 | TEMPERATURE: 97.3 F | HEIGHT: 55 IN | OXYGEN SATURATION: 99 % | WEIGHT: 95.4 LBS | HEART RATE: 63 BPM | SYSTOLIC BLOOD PRESSURE: 128 MMHG

## 2022-10-12 DIAGNOSIS — N64.52 DISCHARGE FROM BREAST: Primary | ICD-10-CM

## 2022-10-12 PROCEDURE — 99213 OFFICE O/P EST LOW 20 MIN: CPT | Performed by: NURSE PRACTITIONER

## 2022-10-12 PROCEDURE — 1036F TOBACCO NON-USER: CPT | Performed by: NURSE PRACTITIONER

## 2022-10-12 PROCEDURE — G8427 DOCREV CUR MEDS BY ELIG CLIN: HCPCS | Performed by: NURSE PRACTITIONER

## 2022-10-12 PROCEDURE — G8420 CALC BMI NORM PARAMETERS: HCPCS | Performed by: NURSE PRACTITIONER

## 2022-10-12 PROCEDURE — G8484 FLU IMMUNIZE NO ADMIN: HCPCS | Performed by: NURSE PRACTITIONER

## 2022-10-12 RX ORDER — DOCUSATE SODIUM 100 MG/1
CAPSULE, LIQUID FILLED ORAL
COMMUNITY
Start: 2022-10-11

## 2022-10-12 NOTE — PROGRESS NOTES
Chief Complaint:   Other (Has white creamy discharge from left breast was noticed 2 weeks ago but did not notice a lump )      History of Present Illness   Source of history provided by:  patient. Carlos Crespo is a 33 y.o. old female who presents to [facility] care, for evaluation of white discharge from her left nipple, which was noticed 2 week(s) prior to arrival.   [place] reports that she was on her menses, when this development was noted. The area is clean, dry, and minimal, white drainage. Staff reports that they had noticed it, while they were changing her. Review of Systems    Unless otherwise stated in this report or unable to obtain because of the patient's clinical or mental status as evidenced by the medical record, this patients's positive and negative responses for Review of Systems, constitutional, psych, eyes, ENT, cardiovascular, respiratory, gastrointestinal, neurological, genitourinary, musculoskeletal, integument systems and systems related to the presenting problem are either stated in the preceding or were not pertinent or were negative for the symptoms and/or complaints related to the medical problem. Past Medical History:  has a past medical history of Autistic disorder and Mental deficiency. Past Surgical History:  has no past surgical history on file. Social History:  reports that she has never smoked. She has never used smokeless tobacco.  Family History: family history is not on file. Allergies: Patient has no known allergies. Physical Exam   Vital Signs:  /72 (Site: Left Upper Arm, Position: Sitting, Cuff Size: Large Adult)   Pulse 63   Temp 97.3 °F (36.3 °C)   Ht 4' 6\" (1.372 m)   Wt 95 lb 6.4 oz (43.3 kg)   SpO2 99%   BMI 23.00 kg/m²    Oxygen Saturation Interpretation: Normal.    Amara Najera (From two sisters) was present as chaperone. Constitutional:  Alert, development consistent with age. HEENT:  NC/NT. Airway patent. Neck:  Normal ROM. Supple. Respiratory:  Clear to auscultation and breath sounds equal.  CV:  Regular rate and rhythm, normal heart sounds, without pathological murmurs, ectopy, gallops, or rubs. GI:  Abdomen Soft, nontender, good bowel sounds. No firm or pulsatile mass. Back:  No costovertebral tenderness. Extremities: No tenderness or edema noted. Breast: No discharge noted from nipple. Breast exam was completed and no lumps or tenderness was noted. Integument:  Normal turgor. Warm, dry, without visible rash, unless noted elsewhere. Lymphatics: No lymphangitis or adenopathy noted. Neurological:  Oriented. Motor functions intact. Test Results Section   (All laboratory and radiology results have been personally reviewed by myself)  Labs:  No results found for this visit on 10/12/22. Imaging: All Radiology results interpreted by Radiologist unless otherwise noted. No results found. Veronica / Jinny Wolfgang was seen today for other. Diagnoses and all orders for this visit:    Discharge from breast  Comments:  Left, none currently noted    *I spoke with Juhi More regarding Erinn's assessment. With Juhi More recently reporting that she had been on her menses and there have been no current discharge from the nipple, I advised that I thought that this was probably hormonal and advised her to continue to monitor her through the next few months. Return if symptoms worsen or fail to improve.     HAMLET Griffin NP

## 2022-10-31 DIAGNOSIS — F41.9 ANXIETY: ICD-10-CM

## 2022-11-01 RX ORDER — CITALOPRAM 40 MG/1
TABLET ORAL
Qty: 93 TABLET | Refills: 2 | Status: SHIPPED | OUTPATIENT
Start: 2022-11-01

## 2022-11-01 RX ORDER — LORAZEPAM 0.5 MG/1
0.25 TABLET ORAL DAILY
Qty: 47 TABLET | Refills: 2 | Status: SHIPPED | OUTPATIENT
Start: 2022-11-01 | End: 2023-08-10

## 2022-11-01 NOTE — TELEPHONE ENCOUNTER
Patients last appointment 4/18/2022.   Patients next scheduled appointment   Future Appointments   Date Time Provider Naeem Puente   1/23/2023 10:00 AM [facility]   4/21/2023  9:30 AM HAMLET Gardner CNP [facility]

## 2022-11-15 RX ORDER — NORGESTIMATE AND ETHINYL ESTRADIOL 0.25-0.035
1 KIT ORAL DAILY
Qty: 28 TABLET | Refills: 5 | Status: SHIPPED | OUTPATIENT
Start: 2022-11-15 | End: 2023-05-02

## 2022-11-15 NOTE — TELEPHONE ENCOUNTER
Patients last appointment 4/18/2022.   Patients next scheduled appointment   Future Appointments   Date Time Provider Naeem Puente   1/23/2023 10:00 AM HAMLET Monteiro CNP [facility] AND [facility]   4/21/2023  9:30 AM HAMLET Monteiro CNP [facility]

## 2022-11-25 RX ORDER — EVENING PRIMROSE OIL 500 MG
CAPSULE ORAL
Qty: 186 CAPSULE | Refills: 1 | Status: SHIPPED | OUTPATIENT
Start: 2022-11-25

## 2022-12-02 NOTE — TELEPHONE ENCOUNTER
[facility]  Advanced Heart Failure, MCS, Transplant and Pulmonary Hypertension Cardiology  Initial Consult Note       Date: 2022   Patient Name: Susan Whitehead  : 1951  MRN: [id number]   PCP: Huy Lynn MD    Reason for consult: STEMI, Cardiogenic Shock  Physician requesting consult: Christin Santos MD    Chief complaint:   Chief Complaint   Patient presents with   • Breathing Problem       Subj: Patient not seen as she went for RHC. Chart reviewed via Epic and chart received from bedside.     HPI:    Susan Whitehead is a 70 year old female with a PMHx of CVA with residual left sided weakness, HTN, prior bariatric surgery.    Patient initially presented to outpatient clinic as as an acute care visit in respiratory distress with acute onset SOB and cough with blood tinged sputum that started 2 days prior. Her cough was accompanied with chest pain. Denies recent fevers, green-yellow colored sputum, or sick contacts at that time. She was found to be hypoxic with Spo2 at 80% on RA and tachycardic in the 140s. Despite 6L/min O2 she continued to demonstrate hypoxemia. Patient was then sent to the ER via EMS for further evaluation of her acute hypoxic respiratory failure' se was increasing hypoxic while en route. She was started on BiPAP in ED but remained hyperventilating, wheezing, and had copious pink frothy secretions concerning for aspiration d/t minimal responsiveness. Decision was made to intubation the patient for airway protection. Found to be in flash pulmonary edema and ECG obtained which revealed field inferior ST segment elevation with the ST segment depression anterior leads, code STEMI was called and patient was given ASA. Patient was taken to cardiac cath lab which was notable for total occlusion of left circumflex treated with VAN and IABP was placed. Patient was transferred to the MICU where she remained hypoxic and required high dose of neosynephrine and required FiO2 of  That is the reason I believe. The order will be faxed. Thanks. 100% and PEEP of 12 on ventilator. Patient was transitioned to a levophed gtt, started on a Lasix gtt, and on MARIBEL with improved in her oxygen saturation. While in MICU she was noted to have transaminitis, metabolic acidosis, worsening ALENA, and hyperglycemia. She continued to required multiple pressors. Central and arterial line were placed. The advanced heart failure team consulted for further evaluation and management.           Key Events:  12/2/22: HR 120s-130s, ST. MAPs 70s. CVP 7, MVO2 56%. Lasix gtt 5 mg/hr-->IV Lasix 100 mg QD. Milrinone 0.25->0.5 mcg/kg/min, vasopressin 0.04 units/min, fent and propofol gtt. Vent FiO2 100%, Maribel 20 PPM. Na 139. BUN 27. Cr 2.32. WBC 19.6. Hgb 15.4. . Net +2.183L/24hrs. Coronary angiogram yesterday. RHC, swan placement, and Impella placement from IABP today.       ROS:  Unable to obtain ROS as patient is in the cath lab.       Past Medical History:   Diagnosis Date   • CVA (cerebral vascular accident) (CMS/[facility])    • Morbid obesity with body mass index of 45.0-49.9 in adult (CMS/[facility]) 4/2/2018    Underwent laparoscopic Sleeve Gastrectomy, by Anu, in July 2019    • Obesity        No past surgical history on file.    No family history on file.    Social History     Tobacco Use   • Smoking status: Never Smoker   • Smokeless tobacco: Never Used   Vaping Use   • Vaping Use: never used   Substance Use Topics   • Alcohol use: Yes   • Drug use: Never       Current Facility-Administered Medications   Medication   • [MAR Hold] chlorhexidine gluconate (PERIDEX) 0.12 % solution 15 mL   • [MAR Hold] CARBOXYMethylcellulose (REFRESH TEARS) 0.5 % ophthalmic solution 1 drop   • [MAR Hold] NORepinephrine (LEVOPHED) 32 mg/250 mL in D5W infusion   • [MAR Hold] cefepime (MAXIPIME) 1,000 mg in sodium chloride 0.9 % 100 mL IVPB   • [Held by provider] DOBUTamine (DOBUTREX) 500 mg/250 mL dextrose 5 % infusion   • [MAR Hold] furosemide (LASIX INJECT) injection 100 mg   • [MAR Hold] potassium  CHLORIDE 20 MEQ/50ML IVPB premix 20 mEq   • LIDOCAINE HCL (PF) 1 % IJ SOLN Pyxis Override   • heparin (porcine) injection   • heparin 2 unit/mL in NaCL 0.9% solution   • Lidocaine HCl (PF) 2 % PF injection Solution   • heparin (porcine) injection   • AMIODarone (CORDARONE) 300 mg in dextrose 5 % 100 mL bolus IVPB   • magnesium sulfate in dextrose 5% 100 mL IVPB premix   • sodium bicarbonate 8.4 % injection   • furosemide (LASIX INJECT) injection   • sodium chloride (NORMAL SALINE) 0.9 % bolus   • PHENYLephrine (VINI-SYNEPHRINE) 50 mg/250 mL in sodium chloride 0.9 % infusion   • [MAR Hold] propofol (DIPRIVAN) infusion   • [MAR Hold] fentaNYL (SUBLIMAZE) 2,500 mcg/250 mL in sodium chloride 0.9 % infusion   • [MAR Hold] oxybutynin (DITROPAN) tablet 5 mg   • [MAR Hold] aspirin (ECOTRIN) enteric coated tablet 81 mg   • [MAR Hold] atorvastatin (LIPITOR) tablet 40 mg   • [MAR Hold] acetaminophen (TYLENOL) tablet 650 mg    Or   • [MAR Hold] acetaminophen (TYLENOL) suppository 650 mg   • [MAR Hold] sodium chloride 0.9 % flush bag 25 mL   • [MAR Hold] sodium chloride (PF) 0.9 % injection 2 mL   • [MAR Hold] ticagrelor (BRILINTA) tablet 90 mg   • [Held by provider] furosemide (LASIX) 250 mg in NaCl 0.9% 125 mL infusion   • [MAR Hold] vasopressin (VASOSTRICT) 20 unit/100 mL dextrose 5 % infusion   • [MAR Hold] docusate sodium-sennosides (SENOKOT S) 50-8.6 MG 1 tablet   • [MAR Hold] milrinone (PRIMACOR) 20 mg/100 mL in dextrose 5 % infusion premix       ALLERGIES:   Allergen Reactions   • Penicillins Other (See Comments), ANAPHYLAXIS and SWELLING     Unknown    • Gabapentin Other (See Comments)     Stopped--occured at 200 mg (in future can consider 100 mg dose)    Adverse Reaction   • Latex Other (See Comments)     Unknown    • Lyrica Other (See Comments)      Stopped; resulted in left leg edema (in future depending on type and severity of pain, we can use)     Adverse Reaction        • Latex   (Environmental) RASH             Physical exam:  Visit Vitals  BP (!) 151/133 (BP Location: LUE - Left upper extremity, Patient Position: Supine)   Pulse (!) 135   Temp 98.8 °F (37.1 °C) (Oral)   Resp (!) 27   Ht 5' 4\" (1.626 m)   Wt 104.3 kg (229 lb 15 oz)   LMP  (LMP Unknown)   SpO2 95%   BMI 39.47 kg/m²       Not unable to obtain PE as patient is in the cath lab.     Recent labs:   I independently reviewed the following labs, echocardiograms, imaging, and procedures    Sodium (mmol/L)   Date Value   12/02/2022 139     Potassium (mmol/L)   Date Value   12/02/2022 3.2 (L)     Chloride (mmol/L)   Date Value   12/02/2022 103     Glucose (mg/dL)   Date Value   12/02/2022 178 (H)     Calcium (mg/dL)   Date Value   12/02/2022 8.3 (L)     Carbon Dioxide (mmol/L)   Date Value   12/02/2022 25     BUN (mg/dL)   Date Value   12/02/2022 27 (H)     Creatinine (mg/dL)   Date Value   12/02/2022 2.32 (H)       WBC (K/mcL)   Date Value   12/02/2022 19.6 (H)     RBC (mil/mcL)   Date Value   12/02/2022 5.49 (H)     HCT (%)   Date Value   12/02/2022 48.3 (H)     HGB (g/dL)   Date Value   12/02/2022 15.4     PLT (K/mcL)   Date Value   12/02/2022 226       TSH (mcUnits/mL)   Date Value   01/28/2020 3.777     NT-proBNP (pg/mL)   Date Value   12/01/2022 5,620 (H)       Encounter Date: 12/01/22   Electrocardiogram 12-Lead   Result Value    Ventricular Rate EKG/Min (BPM) 146    Atrial Rate (BPM) 146    KY-Interval (MSEC) 160    QRS-Interval (MSEC) 74    QT-Interval (MSEC) 332    QTc 517    P Axis (Degrees) 63    R Axis (Degrees) -52    T Axis (Degrees) 68    REPORT TEXT      Sinus tachycardia  Possible  Left atrial enlargement  Left axis deviation  Low voltage QRS  Inferior-posterior infarct  , possibly acute  Lateral injury pattern  ** ** ACUTE MI / STEMI ** **  Abnormal ECG  No previous ECGs available         TTE 12/1/22    SUMMARY:     1. Left ventricle: The cavity size is normal. Wall thickness is increased. The     ejection fraction was measured by single  plane method of disks. The     ejection fraction is 20%.  2. Right ventricle: The cavity size is normal. Systolic function is reduced.     ------------------------------------------------------------------------------  STUDY DATA:  Patient room number: 7106E.  Procedure:  A transthoracic  echocardiogram was performed. Image quality was good. Intravenous contrast  (Definity 2ml) was administered.  M-mode, complete 2D, complete spectral  Doppler, and color Doppler.  Study status:  Routine.  Study completion:  There  were no complications.     FINDINGS     LEFT VENTRICLE:  The cavity size is normal. Wall thickness is increased.  Systolic function is severely reduced.    The ejection fraction was measured  by single plane method of disks. The ejection fraction is 20%.     AORTIC VALVE:  The annulus is normal. The valve is structurally normal. The  valve is trileaflet. The leaflets are normal thickness. Cusp separation is  normal. Velocity is within the normal range. There is no stenosis. There is no  regurgitation.     AORTA:  Aortic root: The root is normal-sized.  Ascending aorta: The vessel is normal-sized.     MITRAL VALVE:  The valve is structurally normal. The annulus is normal. The  leaflets are normal thickness. Leaflet separation is normal. Inflow velocity  is within the normal range. There is no evidence for stenosis. There is mild  regurgitation.     ATRIAL SEPTUM:  The septum is normal.     LEFT ATRIUM:  The atrium is normal in size.     RIGHT VENTRICLE:  The cavity size is normal. Systolic function is reduced. The  estimated peak pressure is 25mm Hg.     PULMONIC VALVE:   The leaflets are normal thickness. There is no  regurgitation.     TRICUSPID VALVE:  The leaflets are normal thickness. Leaflet separation is  normal. There is trivial regurgitation.     RIGHT ATRIUM:  The atrium is normal in size.     PERICARDIUM:   There is no pericardial effusion.     SYSTEMIC VEINS:  Inferior vena cava:  Respirophasic  diameter changes are blunted (< 50%).     ------------------------------------------------------------------------------  Measurements      Left ventricle            Value        Ref        LVOT                     Value          Ref   JUSTO, LAX chord        (N) 4.0   cm     3.8 - 5.2  Diam, S                  1.8   cm       ---------   ESD, LAX chord        (N) 3.5   cm     2.2 - 3.5  Area                     2.5   cm^2     ---------   JUSTO/bsa, LAX chord    (L) 1.6   cm/m^2 2.3 - 3.1   ESD/bsa, LAX chord    (N) 1.4   cm/m^2 1.3 - 2.1  Right ventricle          Value          Ref   PW, ED, LAX           (H) 1.1   cm     0.6 - 0.9  JUSTO, LAX                 2.6   cm       ---------   JUSTO major ax, A4C         6.9   cm     ---------   ESD major ax, A4C         6.6   cm     ---------  Left atrium              Value          Ref   FS major axis, A4C        4     %      ---------  AP dim, ES           (N) 3.3   cm       2.7 - 3.8   JUSTO/bsa major ax, A4C     2.7   cm/m^2 ---------  AP dim index, ES     (L) 1.3   cm/m^2   1.5 - 2.3   ESD/bsa major ax, A4C     2.6   cm/m^2 ---------  Area ES, A4C         (N) 7     cm^2     <=20   CINDY, A4C                  19.7  cm^2   ---------  Area/bsa ES, A4C         2.82  cm^2/m^2 ---------   ADRIANE, A4C                  17.0  cm^2   ---------  Vol, ES, 1-p A4C     (L) 12    ml       22 - 52   FAC, A4C                  14    %      ---------  Vol/bsa, ES, 1-p A4C (L) 5     ml/m^2   11 - 40   IVS, ED               (H) 1.3   cm     0.6 - 0.9   PW, ED                (H) 1.1   cm     0.6 - 0.9  Mitral valve             Value          Ref   IVS/PW, ED                1.11         ---------  Peak E                   0.48  m/sec    ---------   EDV                   (N) 65    ml     46 - 106   Peak A                   0.51  m/sec    ---------   ESV                   (H) 44    ml     14 - 42    Decel time               60    ms       ---------   EF                    (L) 20    %      54 - 74    Peak  E/A ratio           1              ---------   SV                        19    ml     ---------   EDV/bsa               (L) 25    ml/m^2 29 - 61    Tricuspid valve          Value          Ref   ESV/bsa               (N) 17    ml/m^2 8 - 24     TR peak v            (N) 2.3   m/sec    <=2.8   SV/bsa                    7     ml/m^2 ---------  Peak RV-RA grad, S       22    mm Hg    ---------   SV, 1-p A4C               9     ml     ---------   SV/bsa, 1-p A4C           4     ml/m^2 ---------  Aortic root              Value          Ref   ESV, 2-p              (N) 37    ml     14 - 42    Root diam, ED        (L) 2.4   cm       3.1 - 4.5   ESV/bsa, 2-p          (N) 14    ml/m^2 8 - 24   E', med antonino, TDI      (L) 3.5   cm/sec >=7.0   E/e', med antonino, TDI        14           ---------  Legend:  (L)  and  (H)  mary alice values outside specified reference range.     (N)  marks values inside specified reference range.     Prepared and electronically signed by  Fuentes Dillard MD  12/02/2022 10:31          12/1/22  CORONARY ANGIOGRAM/POSSIBLE PTCA - CV   PTCA with Stent   IABP INSERTION   CLOSURE DEVICE   Left heart cath   ULTRASOUND ACCESS     · Prox Cx to Mid Cx lesion with 100% stenosis.     Successful PTCA stenting of the mid left circumflex artery into the obtuse marginal branch of the left circumflex artery utilizing 3.0 x 24 mm Synergy drug-eluting stent with post stent balloon inflation with 3.5 x 20 mm high-pressure balloon from 100% thrombotic occlusion with ROGELIO 0 flow to 0% residual stenosis with ROGELIO-3 flow at the intra-aortic balloon pump cardiac support for acute inferoposterior wall STEMI and cardiogenic shock     Mild nonobstructive disease of the LAD and RCA     Plan and recommendations     Transfer patient to intensive care unit  Aspirin and Brilinta  Slowly gradually wean off Bong-Synephrine  Start patient on beta-blockers, ACE inhibitor's once blood pressure gets stabilized  Cardiac rehab phase 1 and phase  2  Echocardiogram for assessment of LV ejection fraction  Follow-up troponins and EKG     Overall prognosis is guarded       Left / Right Heart Cath      Pressures    Left Heart Cath Pressures:  LV Systolic: 107  LV Begin Diastolic: 11  LV End Diastolic: 27  LV1v PeakdPdt: 843  Ao Systolic: 186  Ao Diastolic: 83  Ao Mean: 117     Left Circumflex   Prox Cx to Mid Cx lesion with 100% stenosis. The vessel is acutely occluded. The lesion is type high/C and thrombotic. Lesion length: diffuse (>20 mm).       Intervention      Prox Cx to Mid Cx lesion   Stent   An unspecified stent was placed.   Post-Intervention Lesion Assessment   The intervention was successful. The guidewire crossed the lesion. Pre-intervention ROGELIO flow: 0. Post-intervention ROGELIO flow is 3. There were no complications.   There is a 0% residual stenosis post intervention.         Impression and plan:      HFpEF  Cardiogenic Shock  - Presented with STEMI, underwent PCI to LCA. 100% stenosis mid-left circumflex underwent 3.0 x 24 mm VAN. Mild nonobstructive disease of the LAD and RCA.   - CXR with pulmonary edema   - IABP support 1:1  - Increasing Milrinone to 0.5 mcg/kg/min  - Lactic (arterial) up to 5.0   - Plan for lasix gtt in setting of Pulm edema  - Plan for RHC and impella placement  - Not currenty an ECMO candidate given given advanced age, morbid obesity, renal injury, concern for some prior to admission debilitation d/t hx of CVA  - Not a candidate for AHF therapies   - Patient's destination is for myocardial recovery. Plan to switch from IABP for impella CP support     STEMI   - ECG on presentation with field inferior ST segment elevation with the ST segment depression anterior leads  - Coronary angiogram (12/1/22) with Prox Cx to Mid Cx lesion with 100% stenosis. Mild nonobstructive disease of the LAD and RCA  - ASA and brilinta        Metabolic Acidosis   - Lactic (arterial) up to 5.0. Will monitor.     ALENA  - Cr up to 2.32, will monitor   -  Possibly may need to initiate CRRT.     Acute hypercapnic Respiratory Failure  Flash Pulmonary Edema 2/2 Cardiogenic Shock  - Intubated (12/1/22) d/t airway protection   - Vent FiO2 100% on Maribel 20 PPM    Hemoptysis  - May need to hold brillinta       Transaminitis  - LFTs uptrending       Leukocytosis  - WBC up to 19.6, will monitor       HTN  - Now hypotensive requiring pressors       HLD  - On atorvastatin 40 mg nightly       Today's Plan  - 71 y/o patient who presented with STEMI, underwent PCI to LCA. 100% stenosis mid-left circumflex underwent 3.0 x 24 mm VAN. Mild nonobstructive disease of the LAD and RCA. Patient developed progressive cardiogenic shock. IABP placed and patient was intubated and sent to the MICU  - Upon my assessment, NE 90 mcg/min, milrinone 0.5 mcg/kg/min, vasopressin 0.04 units/min, patient remains intubated, lactate up to 5. Central line revealed CVP 7.   - Progressive refractory cardiogenic shock, LVEF 25% with akinesis of posterior lateral wall despite circumflex PCI.   - Patient is not a candidate for VA-ECMO support given advanced age, morbid obesity, renal injury, concern for some prior to admission debilitation d/t hx of CVA; will DW CV surgery.   - IABP support 1:1  - Requiring levophed and vasopressin gtts  - Increasing Milrinone to 0.5 mcg/kg/min  - DW interventional cardiology, best change is bridge to myocardial recovery. Patient is not a LVAD candidate, plan to switch out IABP for impella CP support, and placement of SGC. Patient's destination is for myocardial recovery, not a candidate for AHF therapies.   - Currently having hemoptysis, may need to hold brilinta.  - Possibly may need to initiate CRRT.   - In setting of pulmonary edema, will initiate lasix gtt.   - Plan extensively discussed with MICU team.     Prognosis is guarded          Charting performed by stoney Davis for Dr. Rivera          ***

## 2023-01-20 RX ORDER — DOCUSATE SODIUM 100 MG/1
CAPSULE, LIQUID FILLED ORAL
Qty: 186 CAPSULE | Refills: 1 | Status: SHIPPED | OUTPATIENT
Start: 2023-01-20

## 2023-01-20 RX ORDER — CETIRIZINE HYDROCHLORIDE 10 MG/1
10 TABLET ORAL DAILY
Qty: 93 TABLET | Refills: 1 | Status: SHIPPED | OUTPATIENT
Start: 2023-01-20

## 2023-01-23 ENCOUNTER — OFFICE VISIT (OUTPATIENT)
Dept: PRIMARY CARE CLINIC | Age: 33
End: 2023-01-23
Payer: MEDICARE

## 2023-01-23 VITALS
OXYGEN SATURATION: 99 % | HEIGHT: 55 IN | SYSTOLIC BLOOD PRESSURE: 110 MMHG | TEMPERATURE: 98.7 F | WEIGHT: 99.8 LBS | BODY MASS INDEX: 23.1 KG/M2 | HEART RATE: 87 BPM | DIASTOLIC BLOOD PRESSURE: 70 MMHG | RESPIRATION RATE: 19 BRPM

## 2023-01-23 DIAGNOSIS — N64.52 DISCHARGE FROM BREAST: Primary | ICD-10-CM

## 2023-01-23 DIAGNOSIS — F41.9 ANXIETY: ICD-10-CM

## 2023-01-23 DIAGNOSIS — M43.6 TORTICOLLIS: ICD-10-CM

## 2023-01-23 DIAGNOSIS — Z79.899 LONG-TERM CURRENT USE OF ANXIOLYTIC MEDICATION: ICD-10-CM

## 2023-01-23 DIAGNOSIS — Z12.31 ENCOUNTER FOR SCREENING MAMMOGRAM FOR HIGH-RISK PATIENT: ICD-10-CM

## 2023-01-23 PROCEDURE — G8484 FLU IMMUNIZE NO ADMIN: HCPCS | Performed by: NURSE PRACTITIONER

## 2023-01-23 PROCEDURE — 1036F TOBACCO NON-USER: CPT | Performed by: NURSE PRACTITIONER

## 2023-01-23 PROCEDURE — 99213 OFFICE O/P EST LOW 20 MIN: CPT | Performed by: NURSE PRACTITIONER

## 2023-01-23 PROCEDURE — G8420 CALC BMI NORM PARAMETERS: HCPCS | Performed by: NURSE PRACTITIONER

## 2023-01-23 PROCEDURE — G8427 DOCREV CUR MEDS BY ELIG CLIN: HCPCS | Performed by: NURSE PRACTITIONER

## 2023-01-23 RX ORDER — BISMUTH SUBSALICYLATE 525 MG/30ML
LIQUID ORAL
COMMUNITY
Start: 2022-11-03

## 2023-01-23 ASSESSMENT — PATIENT HEALTH QUESTIONNAIRE - PHQ9
SUM OF ALL RESPONSES TO PHQ QUESTIONS 1-9: 0
SUM OF ALL RESPONSES TO PHQ QUESTIONS 1-9: 0
1. LITTLE INTEREST OR PLEASURE IN DOING THINGS: 0
SUM OF ALL RESPONSES TO PHQ QUESTIONS 1-9: 0
2. FEELING DOWN, DEPRESSED OR HOPELESS: 0
SUM OF ALL RESPONSES TO PHQ9 QUESTIONS 1 & 2: 0
SUM OF ALL RESPONSES TO PHQ QUESTIONS 1-9: 0

## 2023-01-23 ASSESSMENT — ENCOUNTER SYMPTOMS
BLOOD IN STOOL: 0
RECTAL PAIN: 0
COLOR CHANGE: 0
ANAL BLEEDING: 0
ABDOMINAL PAIN: 0

## 2023-01-23 NOTE — PROGRESS NOTES
Angel Head : 1990 Sex: female  Age: 27 y.o. Chief Complaint   Patient presents with    Anxiety     6 months check up, no labs, doing good, regular check up       [address] [facility] [address] was seen today for anxiety. Diagnoses and all orders for this visit:    Discharge from breast    Encounter for screening mammogram for high-risk patient  -     JAMEL CHAPARRO DIGITAL SCREEN BILATERAL; Future    Anxiety    Torticollis    Long-term current use of anxiolytic medication      Lab / Imaging Results   (All laboratory and radiology results have been personally reviewed by myself)  Labs:  No results found for this visit on 23. Imaging: All Radiology results interpreted by Radiologist unless otherwise noted. No results found. WAY FORWARD     Educational materials printed for patient's review and were included in patient instructions on her After Visit Summary and given to patient at the end of visit. Counseled regarding above diagnosis, including possible risks and complications,  especially if left uncontrolled. Counseled regarding the possible side effects, risks, benefits and alternatives to treatment; patient and/or guardian verbalizes understanding, agrees, feels comfortable with and wishes to proceed with above treatment plan. Advised patient to call with any new medication issues, and read all Rx info from pharmacy to assure aware of all possible risks and side effects of medication before taking. Reviewed age and gender appropriate health screening exams and vaccinations. Advised patient regarding importance of keeping up with recommended health maintenance and to schedule as soon as possible if overdue, as this is important in assessing for undiagnosed pathology, especially cancer, as well as protecting against potentially harmful/life threatening disease. Patient verbalizes understanding and agrees with above counseling, assessment and plan.      All questions answered. On 01/23/23 I have spent 30 reviewing previous notes, test results and face to face with the patient discussing the diagnosis and importance of compliance with the treatment plan as well as documenting on the day of the visit. Educational materials exercises printed for patient's review and were included in patient instructions on their After Visit Summary and given to patient at the end of visit. Return in about 3 months (around 4/21/2023), or if symptoms worsen or fail to improve. USPTF     No results found for: LABA1C  No results found for: EAG   Abnormal Blood Glucose and Type 2 Diabetes Mellitus: Screening -- Adults aged 36 to 79 years who are overweight or obese Grade: B (Recommended)    BP Readings from Last 3 Encounters:   01/23/23 110/70   10/12/22 128/72   04/18/22 (!) 140/90     High Blood Pressure: Screening and Home Monitoring -- Adults  Grade: A (Recommended) recommends screening for high blood pressure in ages 25 years or older. obtain measurements outside of the clinical setting for diagnostic confirmation before starting treatment. Annual screening for adults aged 36 years or older or those who are at increased risk for blood pressure    (  ) Colorectal Cancer: Screening --Adults aged 48 to 76 years  Grade: A (Recommended) recommends screening for colorectal cancer starting at age 48 years and continuing until age 76 years. Lab Results   Component Value Date    CHOL 180 04/08/2022    CHOL 157 03/13/2021     Lab Results   Component Value Date    TRIG 67 04/08/2022    TRIG 37 03/13/2021     Lab Results   Component Value Date    HDL 73 04/08/2022    HDL 62 03/13/2021     Lab Results   Component Value Date    LDLCALC 94 04/08/2022    LDLCALC 81 03/13/2021     Lab Results   Component Value Date    LABVLDL 13 04/08/2022     Lab Results   Component Value Date    CHOLHDLRATIO 1.3 03/13/2021      (  )  Lipid Disorders in Adults: Screening -- Men 28 and Older  Grade:  A (Recommended) recommends screening men aged 28 and older for lipid disorders. Alcohol Use: Not on file      (NEVER) Alcohol Misuse: Screening and Behavioral Counseling Interventions in Primary Care -- Adults  Grade: B (Recommended) recommends that clinicians screen adults aged 25 years or older for alcohol misuse and provide persons engaged in risky or hazardous drinking with brief behavioral counseling interventions to reduce alcohol misuse. Estimated body mass index is 24.06 kg/m² as calculated from the following:    Height as of this encounter: 4' 6\" (1.372 m). Weight as of this encounter: 99 lb 12.8 oz (45.3 kg). (  )  Obesity: Screening for and Management of-- All Adults  Grade: B(Recommended) recommends screening all adults for obesity. Clinicians should offer or refer patients with a body mass index (BMI) of 30 kg/m2 or higher to intensive, multicomponent behavioral interventions. (Not a fall risk)  Fall Prevention -- Exercise/Physical Therapy: Community-dwelling Adults 72 Years or Older, Increased Risk for Falls   Grade: B (Recommended) recommends exercise or physical therapy to prevent falls in community-dwelling adults aged 72 years or older who are at increased risk for falls. (No new symptoms noted or reported today)  Depression: Screening -- General adult population, including pregnant and postpartum women  Grade: B(Recommended) recommends screening for depression in the general adult population,  Screening should be implemented with adequate systems in place to ensure accurate diagnosis, effective treatment, and appropriate follow-up. (  ) Glaucoma: Screening - Adults and Diabetic Eye Exam      (  ) Thyroid Dysfunction: Screening --      (  ) Vitamin D Deficiency: Screening --      (  ) Skin Cancer: Screening --Asymptomatic adults Grade:  I(Uncertain)     (  ) Cervical Cancer: Screening -- Women 21 to 72 (Pap Smear) or 30-65 (in combo with HPV testing)  Grade: A(Recommended)    (  ) Breast Cancer: Screening with Mammography-- Women aged 48 to 76 years  Grade: B (Recommended) recommends biennial screening mammography for women aged 48 to 76 years. (  ) Osteoporosis: Screening -- Women 65+ and Younger Women at Increased Risk  Grade: B(Recommended) recommends screening for osteoporosis in women aged 72 years or older and in younger women whose fracture risk is equal to or greater than that of a 24-year-old white woman who has no additional risk factors. HPI          Tino Vicente is a 33 y.o. old female who presents to [facility], for evaluation of white discharge from her left nipple, which was noticed 2 week(s) prior to arrival.   Klemme reports that she was on her menses, when this development was noted. The area is clean, dry, and minimal, white drainage. Staff reports that they had noticed it, while they were changing her. Today she presents with no reports of any drainage this was a follow-up no other symptomology reported HPI difficult      CHRONIC CONDITIONS       Depression/Anxiety: Stable depression with generalized anxiety. Mild in intensity but well controlled on     citalopram (CELEXA) 40 MG tablet, TAKE 1 TABLET BY MOUTH ONCE DAILY. , Disp: 93 tablet, Rfl: 2  LORazepam (ATIVAN) 0.5 MG tablet, Take 0.5 tablets by mouth daily for 282 days. Take around 4pm, Disp: 47 tablet, Rfl: 2 without symptoms, no weight gain, no increase in anxiety, no suicidal or homicidal ideation's no unexplained fatigue, or relationship difficulties relayed this visit. ROS     Review of Systems   Constitutional:  Negative for activity change and fever. HENT:  Negative for nosebleeds. Cardiovascular:  Negative for chest pain, palpitations and leg swelling. Gastrointestinal:  Negative for abdominal pain, anal bleeding, blood in stool and rectal pain. Genitourinary:  Negative for hematuria. Skin:  Negative for color change and pallor.    Neurological: Negative for light-headedness and headaches. Hematological:  Does not bruise/bleed easily. Current Outpatient Medications:     STOMACH RELIEF 525 MG/30ML suspension, Take 2 table spoons (30 ml) by mouth every 4 hours PRN for upset stomach, Disp: , Rfl:     cetirizine (ZYRTEC) 10 MG tablet, TAKE 1 TABLET BY MOUTH DAILY. , Disp: 93 tablet, Rfl: 1    docusate sodium (COLACE) 100 MG capsule, TAKE 1 CAPSULE BY MOUTH TWICE A DAY, Disp: 186 capsule, Rfl: 1    CRANBERRY CONCENTRATE 500 MG CAPS, TAKE 1 CAPSULE BY MOUTH TWICE A DAY, Disp: 186 capsule, Rfl: 1    ESTARYLLA 0.25-35 MG-MCG per tablet, TAKE 1 TABLET BY MOUTH DAILY. , Disp: 28 tablet, Rfl: 5    citalopram (CELEXA) 40 MG tablet, TAKE 1 TABLET BY MOUTH ONCE DAILY. , Disp: 93 tablet, Rfl: 2    LORazepam (ATIVAN) 0.5 MG tablet, Take 0.5 tablets by mouth daily for 282 days. Take around 4pm, Disp: 47 tablet, Rfl: 2    clotrimazole-betamethasone (LOTRISONE) 1-0.05 % cream, Apply topically 2 times daily. , Disp: 2 each, Rfl: 1    acetaminophen (TYLENOL) 325 MG tablet, Take 1 tablet by mouth every 6 hours as needed for Fever, Disp: 120 tablet, Rfl: 5    ibuprofen (ADVIL;MOTRIN) 200 MG tablet, Take 1 tablet by mouth every 8 hours as needed for Pain NO MORE THAN 6 PER DAY, Disp: 120 tablet, Rfl: 5    naproxen sodium (ANAPROX) 220 MG tablet, Take 1 tablet by mouth daily, Disp: 31 tablet, Rfl: 5  No Known Allergies    Past Medical History:   Diagnosis Date    Autistic disorder     Mental deficiency      No past surgical history on file. No family history on file.   Social History     Socioeconomic History    Marital status: Single     Spouse name: Not on file    Number of children: Not on file    Years of education: Not on file    Highest education level: Not on file   Occupational History    Not on file   Tobacco Use    Smoking status: Never    Smokeless tobacco: Never   Substance and Sexual Activity    Alcohol use: Not on file     Comment: NON DRINKER    Drug use: Not on file    Sexual activity: Not on file   Other Topics Concern    Not on file   Social History Narrative    Not on file     Social Determinants of Health     Financial Resource Strain: Low Risk     Difficulty of Paying Living Expenses: Not hard at all   Food Insecurity: No Food Insecurity    Worried About Running Out of Food in the Last Year: Never true    Ran Out of Food in the Last Year: Never true   Transportation Needs: Not on file   Physical Activity: Not on file   Stress: Not on file   Social Connections: Not on file   Intimate Partner Violence: Not on file   Housing Stability: Not on file       Vitals:    01/23/23 1043   BP: 110/70   Site: Right Upper Arm   Position: Sitting   Cuff Size: Child   Pulse: 87   Resp: 19   Temp: 98.7 °F (37.1 °C)   TempSrc: Temporal   SpO2: 99%   Weight: 99 lb 12.8 oz (45.3 kg)   Height: 4' 6\" (1.372 m)       EXAM     Physical Exam  Constitutional:       Appearance: Normal appearance. HENT:      Head: Normocephalic. Nose: Nose normal.   Eyes:      Pupils: Pupils are equal, round, and reactive to light. Cardiovascular:      Rate and Rhythm: Normal rate and regular rhythm. Pulmonary:      Effort: Pulmonary effort is normal.   Abdominal:      General: Abdomen is flat. Palpations: Abdomen is soft. Musculoskeletal:         General: Normal range of motion. Skin:     General: Skin is warm and dry. Neurological:      General: No focal deficit present. Mental Status: She is alert. Mental status is at baseline. Psychiatric:         Mood and Affect: Mood normal.            Seen By:  HAMLET Freire CNP  *NOTE: This report was transcribed using voice recognition software. Every effort was made to ensure accuracy; however, inadvertent computerized transcription errors may be present.

## 2023-02-16 ENCOUNTER — OFFICE VISIT (OUTPATIENT)
Dept: PRIMARY CARE CLINIC | Age: 33
End: 2023-02-16

## 2023-02-16 VITALS
BODY MASS INDEX: 24.21 KG/M2 | DIASTOLIC BLOOD PRESSURE: 60 MMHG | OXYGEN SATURATION: 96 % | SYSTOLIC BLOOD PRESSURE: 102 MMHG | HEART RATE: 89 BPM | WEIGHT: 100.4 LBS | TEMPERATURE: 97.1 F

## 2023-02-16 DIAGNOSIS — J06.9 VIRAL URI WITH COUGH: ICD-10-CM

## 2023-02-16 DIAGNOSIS — R09.81 NASAL CONGESTION: Primary | ICD-10-CM

## 2023-02-16 LAB
Lab: NORMAL
PERFORMING INSTRUMENT: NORMAL
QC PASS/FAIL: NORMAL
SARS-COV-2, POC: NORMAL

## 2023-02-16 RX ORDER — BENZONATATE 100 MG/1
100 CAPSULE ORAL 3 TIMES DAILY PRN
Qty: 30 CAPSULE | Refills: 0 | Status: SHIPPED | OUTPATIENT
Start: 2023-02-16 | End: 2023-02-26

## 2023-02-16 RX ORDER — CLONAZEPAM 0.5 MG/1
TABLET ORAL
COMMUNITY
Start: 2023-02-13

## 2023-02-16 NOTE — PROGRESS NOTES
Chief Complaint   Cough (CONGESTION, FATIGUE, RUNNY NOSE AND EYES)      HPI   Source of history provided by: patient.      Erinn Trinidad is a 32 y.o. old female who presents to walk-in care for evaluation of nasal congestion X 4 days. Associated symptoms include nasal congestion and rhinorrhea Since onset symptoms have been about the same.  The patient is full vaccinated for COVID. Has taken delsym at home with some symptomatic relief. Denies fever, chills, headache, sore throat, chest congestion, chest pain, shortness of breath, nausea, vomiting, lethargy, body aches, otalgia, and sinus pressure. Pertinent PMH of: PMHpositive: nothing respiratory.  Denies any PMH of URIhistory: COPD, asthma, recurrent bronchitis, and pneumonia. The patient has no history of tobacco abuse.    ROS   Pertinent positives and negatives are stated within HPI, all other systems reviewed and are negative.  Past Medical History:  has a past medical history of Autistic disorder and Mental deficiency.  Surgical History:  has no past surgical history on file.  Social History:  reports that she has never smoked. She has never used smokeless tobacco.  Family History: family history is not on file.  Allergies: Patient has no known allergies.    Physical Exam      VS:  /60   Pulse 89   Temp 97.1 °F (36.2 °C) (Temporal)   Wt 100 lb 6.4 oz (45.5 kg)   SpO2 96%   BMI 24.21 kg/m²    Oxygen Saturation Interpretation: Normal.    Physical Exam  Vitals and nursing note reviewed.   Constitutional:       Appearance: Normal appearance. She is normal weight.   HENT:      Head: Normocephalic and atraumatic.      Right Ear: Ear canal and external ear normal. No middle ear effusion.      Left Ear: Ear canal and external ear normal.  No middle ear effusion.      Nose: Congestion and rhinorrhea present.      Right Turbinates: Not swollen or pale.      Left Turbinates: Not swollen or pale.      Right Sinus: No maxillary sinus tenderness or  frontal sinus tenderness. Left Sinus: No maxillary sinus tenderness or frontal sinus tenderness. Comments: Clear post nasal drip     Mouth/Throat:      Mouth: Mucous membranes are moist.      Pharynx: Oropharynx is clear. Eyes:      Extraocular Movements: Extraocular movements intact. Conjunctiva/sclera: Conjunctivae normal.      Pupils: Pupils are equal, round, and reactive to light. Cardiovascular:      Rate and Rhythm: Normal rate and regular rhythm. Pulses: Normal pulses. Heart sounds: Normal heart sounds. Pulmonary:      Effort: Pulmonary effort is normal.      Breath sounds: Normal breath sounds. No wheezing, rhonchi or rales. Abdominal:      General: Bowel sounds are normal.      Palpations: Abdomen is soft. Tenderness: There is no abdominal tenderness. Musculoskeletal:         General: Normal range of motion. Cervical back: Normal range of motion and neck supple. Skin:     General: Skin is warm and dry. Capillary Refill: Capillary refill takes less than 2 seconds. Neurological:      General: No focal deficit present. Mental Status: She is alert and oriented to person, place, and time. Psychiatric:         Mood and Affect: Mood normal.         Behavior: Behavior normal.         Thought Content: Thought content normal.         Judgment: Judgment normal.         Lab / Imaging Results   (All laboratory and radiology results have been personally reviewed by myself)  Labs:  Results for orders placed or performed in visit on 02/16/23   POCT COVID-19, Antigen   Result Value Ref Range    SARS-COV-2, POC Not-Detected Not Detected    Lot Number [id number]     QC Pass/Fail pass     Performing Instrument BD Veritor        Imaging: All Radiology results interpreted by Radiologist unless otherwise noted. No results found. Assessment/Plan  Tanya Lee was seen today for cough.     Diagnoses and all orders for this visit:    Nasal congestion  -     POCT COVID-19, Antigen    Viral URI with cough  -     benzonatate (TESSALON) 100 MG capsule; Take 1 capsule by mouth 3 times daily as needed for Cough      Rapid Covid testing in office is negative. Current CDC guidelines were discussed regarding quarantine and when to discontinue quarantine. Regardless of testing results, pt should also be fever free for 24 hours and symptoms should be improved overall prior to returning. Increase fluids and rest.   Other symptomatic relief discussed including Tylenol prn pain/fever. Schedule f/u with PCP in 7-10 days if symptoms persist.  Go to ED sooner if symptoms worsen or change. ED immediately with high or refractory fever, progressive SOB, dyspnea, CP, calf pain/swelling, shaking chills, vomiting, abdominal pain, lethargy, flank pain, or decreased urinary output. Pt verbalizes understanding and is in agreement with plan of care. All questions answered. HAMLET Silveira NP    *NOTE: This report was transcribed using voice recognition software. Every effort was made to ensure accuracy; however, inadvertent computerized transcription errors may be present.

## 2023-04-21 ENCOUNTER — OFFICE VISIT (OUTPATIENT)
Dept: PRIMARY CARE CLINIC | Age: 33
End: 2023-04-21

## 2023-04-21 VITALS
RESPIRATION RATE: 22 BRPM | HEIGHT: 55 IN | SYSTOLIC BLOOD PRESSURE: 100 MMHG | DIASTOLIC BLOOD PRESSURE: 70 MMHG | OXYGEN SATURATION: 100 % | WEIGHT: 94 LBS | HEART RATE: 80 BPM | TEMPERATURE: 98 F | BODY MASS INDEX: 21.76 KG/M2

## 2023-04-21 DIAGNOSIS — M43.6 TORTICOLLIS: ICD-10-CM

## 2023-04-21 DIAGNOSIS — F41.9 ANXIETY: ICD-10-CM

## 2023-04-21 DIAGNOSIS — F84.0 AUTISTIC DISORDER: ICD-10-CM

## 2023-04-21 DIAGNOSIS — Z12.31 ENCOUNTER FOR SCREENING MAMMOGRAM FOR HIGH-RISK PATIENT: ICD-10-CM

## 2023-04-21 DIAGNOSIS — Z79.899 LONG-TERM CURRENT USE OF ANXIOLYTIC MEDICATION: ICD-10-CM

## 2023-04-21 DIAGNOSIS — Z00.00 ROUTINE GENERAL MEDICAL EXAMINATION AT A HEALTH CARE FACILITY: Primary | ICD-10-CM

## 2023-04-21 DIAGNOSIS — F79 MENTAL DEFICIENCY: ICD-10-CM

## 2023-04-21 RX ORDER — FLUOXETINE HYDROCHLORIDE 20 MG/1
1 CAPSULE ORAL DAILY
COMMUNITY
Start: 2023-04-12

## 2023-04-21 RX ORDER — FLUOXETINE HYDROCHLORIDE 40 MG/1
1 CAPSULE ORAL EVERY MORNING
COMMUNITY
Start: 2023-04-14

## 2023-04-21 SDOH — ECONOMIC STABILITY: HOUSING INSECURITY
IN THE LAST 12 MONTHS, WAS THERE A TIME WHEN YOU DID NOT HAVE A STEADY PLACE TO SLEEP OR SLEPT IN A SHELTER (INCLUDING NOW)?: NO

## 2023-04-21 SDOH — ECONOMIC STABILITY: FOOD INSECURITY: WITHIN THE PAST 12 MONTHS, THE FOOD YOU BOUGHT JUST DIDN'T LAST AND YOU DIDN'T HAVE MONEY TO GET MORE.: NEVER TRUE

## 2023-04-21 SDOH — ECONOMIC STABILITY: INCOME INSECURITY: HOW HARD IS IT FOR YOU TO PAY FOR THE VERY BASICS LIKE FOOD, HOUSING, MEDICAL CARE, AND HEATING?: NOT HARD AT ALL

## 2023-04-21 SDOH — ECONOMIC STABILITY: FOOD INSECURITY: WITHIN THE PAST 12 MONTHS, YOU WORRIED THAT YOUR FOOD WOULD RUN OUT BEFORE YOU GOT MONEY TO BUY MORE.: NEVER TRUE

## 2023-04-21 ASSESSMENT — ENCOUNTER SYMPTOMS
ABDOMINAL PAIN: 0
BLOOD IN STOOL: 0
RECTAL PAIN: 0
COLOR CHANGE: 0
ANAL BLEEDING: 0

## 2023-04-21 NOTE — PROGRESS NOTES
Patient verbalizes understanding and agrees with above counseling, assessment and plan. All questions answered. On 04/21/23 I have spent 30 reviewing previous notes, test results and face to face with the patient discussing the diagnosis and importance of compliance with the treatment plan as well as documenting on the day of the visit. Educational materials exercises printed for patient's review and were included in patient instructions on their After Visit Summary and given to patient at the end of visit. Return in about 6 months (around 10/21/2023). USPTF     No results found for: LABA1C  No results found for: EAG   Abnormal Blood Glucose and Type 2 Diabetes Mellitus: Screening -- Adults aged 36 to 79 years who are overweight or obese Grade: B (Recommended)    BP Readings from Last 3 Encounters:   04/21/23 100/70   02/16/23 102/60   01/23/23 110/70     High Blood Pressure: Screening and Home Monitoring -- Adults  Grade: A (Recommended) recommends screening for high blood pressure in ages 25 years or older. obtain measurements outside of the clinical setting for diagnostic confirmation before starting treatment. Annual screening for adults aged 36 years or older or those who are at increased risk for blood pressure    (  ) Colorectal Cancer: Screening --Adults aged 48 to 76 years  Grade: A (Recommended) recommends screening for colorectal cancer starting at age 48 years and continuing until age 76 years.      Lab Results   Component Value Date    CHOL 180 04/08/2022    CHOL 157 03/13/2021     Lab Results   Component Value Date    TRIG 67 04/08/2022    TRIG 37 03/13/2021     Lab Results   Component Value Date    HDL 73 04/08/2022    HDL 62 03/13/2021     Lab Results   Component Value Date    LDLCALC 94 04/08/2022    LDLCALC 81 03/13/2021     Lab Results   Component Value Date    LABVLDL 13 04/08/2022     Lab Results   Component Value Date    CHOLHDLRATIO 1.3 03/13/2021      (  )  Lipid Disorders

## 2023-04-24 ENCOUNTER — HOSPITAL ENCOUNTER (OUTPATIENT)
Dept: MAMMOGRAPHY | Age: 33
Discharge: HOME | End: 2023-04-26

## 2023-04-24 DIAGNOSIS — Z12.31 ENCOUNTER FOR SCREENING MAMMOGRAM FOR HIGH-RISK PATIENT: ICD-10-CM

## 2023-05-24 DIAGNOSIS — Z78.9 USES BIRTH CONTROL: Primary | ICD-10-CM

## 2023-05-24 RX ORDER — NORGESTIMATE AND ETHINYL ESTRADIOL 0.25-0.035
1 KIT ORAL DAILY
Qty: 28 TABLET | Refills: 5 | Status: SHIPPED | OUTPATIENT
Start: 2023-05-24 | End: 2023-11-08

## 2023-05-24 NOTE — TELEPHONE ENCOUNTER
Patients last appointment 4/21/2023.   Patients next scheduled appointment   Future Appointments   Date Time Provider Naeem Puente   10/23/2023  9:30 AM HAMLET Daniels - CNP [facility]

## 2023-06-01 RX ORDER — EVENING PRIMROSE OIL 500 MG
CAPSULE ORAL
Qty: 186 CAPSULE | Refills: 1 | Status: SHIPPED | OUTPATIENT
Start: 2023-06-01

## 2023-06-01 NOTE — TELEPHONE ENCOUNTER
Patients last appointment 4/21/2023.   Patients next scheduled appointment   Future Appointments   Date Time Provider Naeem Puente   10/23/2023  9:30 AM Deepa Torres APRN - CNP [facility] This patient qualifies for a TCM visit, as they are being seen within the required time for CMS of 2 business days from discharge, a phone call by an RN is not required.  You can therefore see them as a TCM visit and charge appropriately.      Coding guide  39362       - face-to-face within 14 day       - moderate decision complexity (LACE+ score of 28-58)  26600       - face-to-face within 7 days       - high medical decision complexity (LACE+ score 59+)

## 2023-06-07 ENCOUNTER — TELEPHONE (OUTPATIENT)
Dept: FAMILY MEDICINE CLINIC | Age: 33
End: 2023-06-07

## 2023-06-07 DIAGNOSIS — N64.52 DISCHARGE FROM BREAST: Primary | ICD-10-CM

## 2023-06-07 NOTE — TELEPHONE ENCOUNTER
Erinn's ins. Will not pay for a screening mammo at age 27. BUT pt. Was having left breast discharge. So can we make order gadiel. Diagnostic mammo? Order pended. Then fax to [facility] [phone].

## 2023-06-09 NOTE — PROGRESS NOTES
20  Erinn Trinidad : 1990 Sex: female  Age: 26 y.o. Chief Complaint   Patient presents with    6 Month Follow-Up     She wants a flu shot today.  Rash     States that she has a huge black rash under her left arm pit area, that is itchy X 2 weeks.  Discuss Medications     wants to get a perscription for a cranberry pill.  Medication Refill       Rash on the axilla left arm not resolving and is pruritic       Review of Systems   Constitutional: Negative for activity change, chills, diaphoresis, fatigue, fever and unexpected weight change. HENT: Negative for trouble swallowing and voice change. Eyes: Negative for visual disturbance. Respiratory: Negative for cough, chest tightness, shortness of breath and wheezing. Cardiovascular: Negative for chest pain, palpitations and leg swelling. Gastrointestinal: Negative for abdominal pain, blood in stool, constipation, diarrhea, nausea and vomiting. Endocrine: Negative for polydipsia, polyphagia and polyuria. Genitourinary: Negative for dysuria, enuresis, frequency and hematuria. Musculoskeletal: Negative for arthralgias, back pain, gait problem, joint swelling, myalgias and neck stiffness. Skin: Positive for rash. Neurological: Negative for dizziness, seizures, syncope, facial asymmetry, weakness, light-headedness, numbness and headaches. Hematological: Does not bruise/bleed easily. Psychiatric/Behavioral: Negative for behavioral problems, confusion, hallucinations and suicidal ideas. The patient is not nervous/anxious.           Current Outpatient Medications:     naproxen sodium (ANAPROX) 220 MG tablet, Take 1 tablet by mouth daily as needed for Pain MENSTRUAL CYCLE, Disp: 30 tablet, Rfl: 5    ibuprofen (ADVIL;MOTRIN) 200 MG tablet, Take 1 tablet by mouth every 8 hours as needed for Pain NO MORE THAN 6 PER DAY, Disp: 120 tablet, Rfl: 5    bismuth subsalicylate (PEPTO BISMOL) 262 MG/15ML suspension, Take 15 mLs by No Rhythm: Normal rate and regular rhythm. Pulses: Normal pulses. Heart sounds: Normal heart sounds. No murmur. No friction rub. No gallop. Pulmonary:      Effort: Pulmonary effort is normal. No respiratory distress. Breath sounds: Normal breath sounds. No stridor. No wheezing, rhonchi or rales. Chest:      Chest wall: No tenderness. Abdominal:      General: Bowel sounds are normal. There is no distension. Palpations: Abdomen is soft. Musculoskeletal:         General: No swelling, tenderness, deformity or signs of injury. Right lower leg: No edema. Left lower leg: No edema. Lymphadenopathy:      Cervical: No cervical adenopathy. Skin:     General: Skin is warm and dry. Capillary Refill: Capillary refill takes 2 to 3 seconds. Findings: No bruising, lesion or rash. Comments: Clearly demarcated rash in the left axilla with no streaking or redness appreciated. There is no evidence of contact dermatitis   Neurological:      General: No focal deficit present. Mental Status: She is alert and oriented to person, place, and time. Motor: No weakness. Gait: Gait normal.   Psychiatric:         Attention and Perception: Attention normal.         Mood and Affect: Mood normal.         Behavior: Behavior normal.         Thought Content: Thought content does not include homicidal or suicidal ideation. Thought content does not include homicidal or suicidal plan. Assessment and Plan:    Alex Robins was seen today for 6 month follow-up, rash, discuss medications and medication refill. Diagnoses and all orders for this visit:    Long-term current use of anxiolytic medication  -     CBC Auto Differential; Future  -     Comprehensive Metabolic Panel; Future    Anxiety  -     LORazepam (ATIVAN) 0.5 MG tablet; Take 0.5 tablets by mouth daily for 31 days. -     CBC Auto Differential; Future  -     Comprehensive Metabolic Panel;  Future    Screening, lipid  -

## 2023-06-27 ENCOUNTER — TELEPHONE (OUTPATIENT)
Dept: PRIMARY CARE CLINIC | Age: 33
End: 2023-06-27

## 2023-06-27 DIAGNOSIS — R92.8 ABNORMAL MAMMOGRAM: ICD-10-CM

## 2023-06-27 DIAGNOSIS — N64.52 DISCHARGE FROM BREAST: Primary | ICD-10-CM

## 2023-06-27 RX ORDER — NAPROXEN SODIUM 220 MG
TABLET ORAL
Qty: 30 TABLET | Refills: 2 | Status: SHIPPED | OUTPATIENT
Start: 2023-06-27

## 2023-07-20 DIAGNOSIS — R92.8 ABNORMAL MAMMOGRAM: ICD-10-CM

## 2023-07-20 DIAGNOSIS — N64.52 DISCHARGE FROM BREAST: ICD-10-CM

## 2023-08-04 RX ORDER — DOCUSATE SODIUM 100 MG/1
CAPSULE, LIQUID FILLED ORAL
Qty: 186 CAPSULE | Refills: 0 | Status: SHIPPED | OUTPATIENT
Start: 2023-08-04

## 2023-08-04 RX ORDER — CETIRIZINE HYDROCHLORIDE 10 MG/1
10 TABLET ORAL DAILY
Qty: 93 TABLET | Refills: 0 | Status: SHIPPED | OUTPATIENT
Start: 2023-08-04

## 2023-08-04 NOTE — TELEPHONE ENCOUNTER
Patients last appointment 4/21/2023.   Patients next scheduled appointment   Future Appointments   Date Time Provider [address]   10/23/2023  9:30 AM HAMLET Tom - CNP [facility]     refill

## 2023-08-22 DIAGNOSIS — M54.50 LOW BACK PAIN WITHOUT SCIATICA, UNSPECIFIED BACK PAIN LATERALITY, UNSPECIFIED CHRONICITY: ICD-10-CM

## 2023-08-22 RX ORDER — ACETAMINOPHEN 325 MG/1
TABLET ORAL
Qty: 30 TABLET | Refills: 5 | Status: SHIPPED | OUTPATIENT
Start: 2023-08-22

## 2023-08-22 RX ORDER — IBUPROFEN 200 MG
TABLET ORAL
Qty: 30 TABLET | Refills: 5 | Status: SHIPPED | OUTPATIENT
Start: 2023-08-22

## 2023-08-22 NOTE — TELEPHONE ENCOUNTER
Patients last appointment 4/21/2023.   Patients next scheduled appointment   Future Appointments   Date Time Provider [address]   10/23/2023  9:30 AM HAMLET Hamilton - CNP [facility]

## 2023-10-16 ENCOUNTER — TELEPHONE (OUTPATIENT)
Dept: PRIMARY CARE CLINIC | Age: 33
End: 2023-10-16

## 2023-10-16 DIAGNOSIS — Z13.29 SCREENING FOR THYROID DISORDER: ICD-10-CM

## 2023-10-16 DIAGNOSIS — Z00.00 ROUTINE GENERAL MEDICAL EXAMINATION AT A HEALTH CARE FACILITY: ICD-10-CM

## 2023-10-16 DIAGNOSIS — Z79.899 LONG-TERM CURRENT USE OF ANXIOLYTIC MEDICATION: Primary | ICD-10-CM

## 2023-10-16 DIAGNOSIS — Z13.220 SCREENING FOR HYPERLIPIDEMIA: ICD-10-CM

## 2023-10-16 DIAGNOSIS — F84.0 AUTISTIC DISORDER: ICD-10-CM

## 2023-10-16 NOTE — TELEPHONE ENCOUNTER
Lady Hudson from [facility] called in requesting routine lab orders, patient has an upcoming appointment. Please place at your earliest convenience.

## 2023-11-06 DIAGNOSIS — Z78.9 USES BIRTH CONTROL: ICD-10-CM

## 2023-11-06 RX ORDER — NORGESTIMATE AND ETHINYL ESTRADIOL 0.25-0.035
1 KIT ORAL DAILY
Qty: 28 TABLET | Refills: 5 | Status: SHIPPED | OUTPATIENT
Start: 2023-11-06 | End: 2024-04-22

## 2023-11-06 NOTE — TELEPHONE ENCOUNTER
Patients last appointment 4/21/2023.   Patients next scheduled appointment   Future Appointments   Date Time Provider [address]   12/15/2023 10:30 AM HAMLET Dominique - CNP [facility]

## 2023-11-07 DIAGNOSIS — K59.00 CONSTIPATION, UNSPECIFIED CONSTIPATION TYPE: Primary | ICD-10-CM

## 2023-11-08 RX ORDER — DOCUSATE SODIUM 100 MG
100 CAPSULE ORAL 2 TIMES DAILY
Qty: 186 CAPSULE | Refills: 1 | Status: SHIPPED | OUTPATIENT
Start: 2023-11-08

## 2023-11-08 RX ORDER — CETIRIZINE HYDROCHLORIDE 10 MG/1
10 TABLET ORAL DAILY
Qty: 93 TABLET | Refills: 1 | Status: SHIPPED | OUTPATIENT
Start: 2023-11-08

## 2023-11-08 NOTE — TELEPHONE ENCOUNTER
Patients last appointment 4/21/2023.   Patients next scheduled appointment   Future Appointments   Date Time Provider [address]   12/15/2023 10:30 AM HAMLET Gutierrez - CNP [facility]

## 2023-12-07 RX ORDER — EVENING PRIMROSE OIL 500 MG
CAPSULE ORAL
Qty: 186 CAPSULE | Refills: 0 | Status: SHIPPED | OUTPATIENT
Start: 2023-12-07

## 2023-12-07 RX ORDER — BISMUTH SUBSALICYLATE 525 MG/30ML
LIQUID ORAL
Qty: 237 ML | Refills: 1 | Status: SHIPPED | OUTPATIENT
Start: 2023-12-07

## 2023-12-07 NOTE — TELEPHONE ENCOUNTER
Patients last appointment 4/21/2023.   Patients next scheduled appointment   Future Appointments   Date Time Provider [address]   12/15/2023 10:30 AM HAMLET Solis - CNP [facility]

## 2023-12-07 NOTE — TELEPHONE ENCOUNTER
Patients last appointment 4/21/2023.   Patients next scheduled appointment   Future Appointments   Date Time Provider [address]   12/15/2023 10:30 AM HAMLET Kelley - CNP [facility]

## 2023-12-15 ENCOUNTER — OFFICE VISIT (OUTPATIENT)
Dept: PRIMARY CARE CLINIC | Age: 33
End: 2023-12-15
Payer: MEDICARE

## 2023-12-15 VITALS
HEART RATE: 70 BPM | WEIGHT: 87.6 LBS | SYSTOLIC BLOOD PRESSURE: 100 MMHG | DIASTOLIC BLOOD PRESSURE: 62 MMHG | OXYGEN SATURATION: 100 % | TEMPERATURE: 98.1 F | BODY MASS INDEX: 21.12 KG/M2

## 2023-12-15 DIAGNOSIS — F41.9 ANXIETY: ICD-10-CM

## 2023-12-15 DIAGNOSIS — Z00.00 ROUTINE GENERAL MEDICAL EXAMINATION AT A HEALTH CARE FACILITY: ICD-10-CM

## 2023-12-15 DIAGNOSIS — Z79.899 LONG-TERM CURRENT USE OF ANXIOLYTIC MEDICATION: Primary | ICD-10-CM

## 2023-12-15 DIAGNOSIS — M43.6 TORTICOLLIS: ICD-10-CM

## 2023-12-15 DIAGNOSIS — F79 MENTAL DEFICIENCY: ICD-10-CM

## 2023-12-15 PROCEDURE — 1036F TOBACCO NON-USER: CPT | Performed by: NURSE PRACTITIONER

## 2023-12-15 PROCEDURE — 99214 OFFICE O/P EST MOD 30 MIN: CPT | Performed by: NURSE PRACTITIONER

## 2023-12-15 PROCEDURE — G8420 CALC BMI NORM PARAMETERS: HCPCS | Performed by: NURSE PRACTITIONER

## 2023-12-15 PROCEDURE — G8484 FLU IMMUNIZE NO ADMIN: HCPCS | Performed by: NURSE PRACTITIONER

## 2023-12-15 PROCEDURE — G8427 DOCREV CUR MEDS BY ELIG CLIN: HCPCS | Performed by: NURSE PRACTITIONER

## 2023-12-15 NOTE — PROGRESS NOTES
Kirk Hoover : 1990 Sex: female  Age: 26 y.o. Chief Complaint   Patient presents with    [address] [place], [place] [address]way was seen today for 6 month follow-up. Diagnoses and all orders for this visit:    Long-term current use of anxiolytic medication    Routine general medical examination at a health care facility    Mental deficiency    Anxiety    Torticollis    BMI 21.0-21.9, adult          Lab / Imaging Results   (All laboratory and radiology results have been personally reviewed by myself)  Labs:  No results found for this visit on 12/15/23. Imaging: All Radiology results interpreted by Radiologist unless otherwise noted. No results found. WAY FORWARD     Educational materials printed for patient's review and were included in patient instructions on her After Visit Summary and given to patient at the end of visit. Counseled regarding above diagnosis, including possible risks and complications,  especially if left uncontrolled. Counseled regarding the possible side effects, risks, benefits and alternatives to treatment; patient and/or guardian verbalizes understanding, agrees, feels comfortable with and wishes to proceed with above treatment plan. Advised patient to call with any new medication issues, and read all Rx info from pharmacy to assure aware of all possible risks and side effects of medication before taking. Reviewed age and gender appropriate health screening exams and vaccinations. Advised patient regarding importance of keeping up with recommended health maintenance and to schedule as soon as possible if overdue, as this is important in assessing for undiagnosed pathology, especially cancer, as well as protecting against potentially harmful/life threatening disease. Patient verbalizes understanding and agrees with above counseling, assessment and plan. All questions answered.     On 12/15/23 I have spent 30 reviewing

## 2024-03-19 RX ORDER — EVENING PRIMROSE OIL 500 MG
CAPSULE ORAL
Qty: 186 CAPSULE | Refills: 1 | Status: SHIPPED | OUTPATIENT
Start: 2024-03-19

## 2024-04-24 ENCOUNTER — OFFICE VISIT (OUTPATIENT)
Dept: PRIMARY CARE CLINIC | Age: 34
End: 2024-04-24

## 2024-04-24 VITALS
TEMPERATURE: 98 F | WEIGHT: 84.2 LBS | DIASTOLIC BLOOD PRESSURE: 60 MMHG | RESPIRATION RATE: 18 BRPM | OXYGEN SATURATION: 95 % | HEART RATE: 80 BPM | HEIGHT: 55 IN | SYSTOLIC BLOOD PRESSURE: 96 MMHG | BODY MASS INDEX: 19.48 KG/M2

## 2024-04-24 DIAGNOSIS — Z48.02 ENCOUNTER FOR REMOVAL OF SUTURES: Primary | ICD-10-CM

## 2024-04-24 SDOH — ECONOMIC STABILITY: INCOME INSECURITY: HOW HARD IS IT FOR YOU TO PAY FOR THE VERY BASICS LIKE FOOD, HOUSING, MEDICAL CARE, AND HEATING?: NOT VERY HARD

## 2024-04-24 SDOH — ECONOMIC STABILITY: FOOD INSECURITY: WITHIN THE PAST 12 MONTHS, THE FOOD YOU BOUGHT JUST DIDN'T LAST AND YOU DIDN'T HAVE MONEY TO GET MORE.: NEVER TRUE

## 2024-04-24 SDOH — ECONOMIC STABILITY: FOOD INSECURITY: WITHIN THE PAST 12 MONTHS, YOU WORRIED THAT YOUR FOOD WOULD RUN OUT BEFORE YOU GOT MONEY TO BUY MORE.: NEVER TRUE

## 2024-04-24 ASSESSMENT — PATIENT HEALTH QUESTIONNAIRE - PHQ9: DEPRESSION UNABLE TO ASSESS: FUNCTIONAL CAPACITY MOTIVATION LIMITS ACCURACY

## 2024-04-24 NOTE — PROGRESS NOTES
Chief Complaint       Suture / Staple Removal (Located in lips)      History of Present Illness   Source of history provided by:  caregiver ([employer] [facility] [employer]). Patient has mental deficiency and does not speak.       Erinn Trinidad is a 33 y.o. old female presenting to the walk in clinic with caregiver for removal of sutures from bottom lip, which were placed 10 days ago. Reports patient fell and was seen in [facility] ED for suture placement. Denies any drainage from the wound site, pain or bleeding from the area, or wound dehiscence. No fever or chills noted. States the wound is healing well without any complications. Caregiver is unsure of how many sutures were placed. Caregiver states that they were not told when stitches should be removed.      ROS    Unless otherwise stated in this report or unable to obtain because of the patient's clinical or mental status as evidenced by the medical record, this patients's positive and negative responses for Review of Systems, constitutional, psych, eyes, ENT, cardiovascular, respiratory, gastrointestinal, neurological, genitourinary, musculoskeletal, integument systems and systems related to the presenting problem are either stated in the preceding or were not pertinent or were negative for the symptoms and/or complaints related to the medical problem.    Past Medical History:  has a past medical history of Autistic disorder and Mental deficiency.  Past Surgical History:  has no past surgical history on file.  Social History:  reports that she has never smoked. She has never used smokeless tobacco.  Family History: family history is not on file.   Allergies: Patient has no known allergies.    Physical Exam         VS:  BP 96/60 (Site: Right Upper Arm, Position: Sitting, Cuff Size: Child)   Pulse 80   Temp 98 °F (36.7 °C) (Temporal)   Resp 18   Ht 1.372 m (4' 6.02\")   Wt 38.2 kg (84 lb 3.2 oz)   SpO2 95%   BMI 20.29 kg/m²    Oxygen Saturation Interpretation:

## 2024-04-26 DIAGNOSIS — K59.00 CONSTIPATION, UNSPECIFIED CONSTIPATION TYPE: ICD-10-CM

## 2024-04-26 DIAGNOSIS — Z78.9 USES BIRTH CONTROL: ICD-10-CM

## 2024-04-26 RX ORDER — CETIRIZINE HYDROCHLORIDE 10 MG/1
10 TABLET ORAL DAILY
Qty: 31 TABLET | Refills: 5 | Status: SHIPPED | OUTPATIENT
Start: 2024-04-26

## 2024-04-26 RX ORDER — DOCUSATE SODIUM 100 MG/1
100 CAPSULE, LIQUID FILLED ORAL 2 TIMES DAILY
Qty: 62 CAPSULE | Refills: 5 | Status: SHIPPED | OUTPATIENT
Start: 2024-04-26

## 2024-04-26 RX ORDER — NORGESTIMATE AND ETHINYL ESTRADIOL 0.25-0.035
KIT ORAL
Qty: 28 TABLET | Refills: 11 | Status: SHIPPED
Start: 2024-04-26 | End: 2024-04-26 | Stop reason: SDUPTHER

## 2024-04-26 RX ORDER — NORGESTIMATE AND ETHINYL ESTRADIOL 0.25-0.035
KIT ORAL
Qty: 28 TABLET | Refills: 11 | Status: SHIPPED | OUTPATIENT
Start: 2024-04-26

## 2024-06-17 ENCOUNTER — OFFICE VISIT (OUTPATIENT)
Dept: PRIMARY CARE CLINIC | Age: 34
End: 2024-06-17
Payer: MEDICARE

## 2024-06-17 VITALS — BODY MASS INDEX: 18.97 KG/M2 | HEIGHT: 55 IN | WEIGHT: 82 LBS

## 2024-06-17 DIAGNOSIS — R63.4 WEIGHT LOSS: ICD-10-CM

## 2024-06-17 DIAGNOSIS — Z79.899 LONG-TERM CURRENT USE OF ANXIOLYTIC MEDICATION: ICD-10-CM

## 2024-06-17 DIAGNOSIS — F79 MENTAL DEFICIENCY: ICD-10-CM

## 2024-06-17 DIAGNOSIS — M54.50 LOW BACK PAIN WITHOUT SCIATICA, UNSPECIFIED BACK PAIN LATERALITY, UNSPECIFIED CHRONICITY: ICD-10-CM

## 2024-06-17 DIAGNOSIS — Z00.00 ROUTINE GENERAL MEDICAL EXAMINATION AT A HEALTH CARE FACILITY: Primary | ICD-10-CM

## 2024-06-17 DIAGNOSIS — Z13.29 SCREENING FOR THYROID DISORDER: ICD-10-CM

## 2024-06-17 DIAGNOSIS — F84.0 AUTISTIC DISORDER: ICD-10-CM

## 2024-06-17 DIAGNOSIS — F41.9 ANXIETY: ICD-10-CM

## 2024-06-17 DIAGNOSIS — Z13.220 SCREENING FOR LIPOID DISORDERS: ICD-10-CM

## 2024-06-17 DIAGNOSIS — B35.4 TINEA CORPORIS: ICD-10-CM

## 2024-06-17 PROCEDURE — G8427 DOCREV CUR MEDS BY ELIG CLIN: HCPCS | Performed by: NURSE PRACTITIONER

## 2024-06-17 PROCEDURE — G8420 CALC BMI NORM PARAMETERS: HCPCS | Performed by: NURSE PRACTITIONER

## 2024-06-17 PROCEDURE — 99214 OFFICE O/P EST MOD 30 MIN: CPT | Performed by: NURSE PRACTITIONER

## 2024-06-17 PROCEDURE — 1036F TOBACCO NON-USER: CPT | Performed by: NURSE PRACTITIONER

## 2024-06-17 RX ORDER — CLOTRIMAZOLE AND BETAMETHASONE DIPROPIONATE 10; .64 MG/G; MG/G
CREAM TOPICAL
Qty: 45 G | Refills: 0 | Status: SHIPPED | OUTPATIENT
Start: 2024-06-17

## 2024-06-17 RX ORDER — PYRIDOXINE HCL (VITAMIN B6) 100 MG
1 TABLET ORAL 2 TIMES DAILY
Qty: 186 CAPSULE | Refills: 1 | Status: SHIPPED | OUTPATIENT
Start: 2024-06-17

## 2024-06-17 RX ORDER — NAPROXEN SODIUM 220 MG
TABLET ORAL
Qty: 30 TABLET | Refills: 2 | Status: SHIPPED | OUTPATIENT
Start: 2024-06-17

## 2024-06-17 RX ORDER — CLOTRIMAZOLE AND BETAMETHASONE DIPROPIONATE 10; .64 MG/G; MG/G
CREAM TOPICAL
Qty: 2 EACH | Refills: 1 | Status: SHIPPED
Start: 2024-06-17 | End: 2024-06-17

## 2024-06-17 RX ORDER — AMMONIUM LACTATE 12 G/100G
CREAM TOPICAL
COMMUNITY
Start: 2024-04-14

## 2024-06-17 RX ORDER — CLOTRIMAZOLE AND BETAMETHASONE DIPROPIONATE 10; .64 MG/G; MG/G
CREAM TOPICAL
Qty: 45 G | Refills: 0 | Status: SHIPPED
Start: 2024-06-17 | End: 2024-06-17

## 2024-06-17 RX ORDER — IBUPROFEN 200 MG
TABLET ORAL
Qty: 30 TABLET | Refills: 5 | Status: SHIPPED | OUTPATIENT
Start: 2024-06-17

## 2024-06-17 RX ORDER — ACETAMINOPHEN 325 MG/1
TABLET ORAL
Qty: 30 TABLET | Refills: 5 | Status: SHIPPED | OUTPATIENT
Start: 2024-06-17

## 2024-06-17 ASSESSMENT — ENCOUNTER SYMPTOMS
BLOOD IN STOOL: 0
ABDOMINAL PAIN: 0
COLOR CHANGE: 0
RECTAL PAIN: 0
ANAL BLEEDING: 0

## 2024-06-17 NOTE — PROGRESS NOTES
Erinn Trinidad : 1990 Sex: female  Age: 33 y.o.    Chief Complaint   Patient presents with    6 Month Follow-Up     Unable to get B/P or O2 .     Pain     Armpit pain. Flinches to touch it.        ASSESSMENT AND PLAN     Erinn was seen today for 6 month follow-up and pain.    Diagnoses and all orders for this visit:    Routine general medical examination at a health care facility  -     CBC with Auto Differential; Future  -     Comprehensive Metabolic Panel, Fasting; Future  -     Urinalysis; Future    Low back pain without sciatica, unspecified back pain laterality, unspecified chronicity  -     ibuprofen (ADVIL;MOTRIN) 200 MG tablet; TAKE ONE TABLET BY MOUTH EVERY EIGHT HOURS AS NEEDED FOR PAIN. (MAX: 6/DAY)    Long-term current use of anxiolytic medication    Mental deficiency    Anxiety    Weight loss  -     CBC with Auto Differential; Future  -     Comprehensive Metabolic Panel, Fasting; Future  -     TSH; Future  -     Urinalysis; Future    Autistic disorder    Adult BMI <19 kg/sq m  -     CBC with Auto Differential; Future  -     Comprehensive Metabolic Panel, Fasting; Future    Screening for thyroid disorder  -     TSH; Future    Screening for lipoid disorders  -     Lipid Panel; Future    Tinea corporis  -     Discontinue: clotrimazole-betamethasone (LOTRISONE) 1-0.05 % cream; Apply topically 2 times daily. For 14 days  -     clotrimazole-betamethasone (LOTRISONE) 1-0.05 % cream; Apply topically 2 times daily. For 14 days Left armpit    Other orders  -     acetaminophen (TYLENOL) 325 MG tablet; TAKE 1 TAB BY MOUTH EVERY  6 HR AS NEEDED FOR ELEV TEMP 100 OR OVER.  -     Discontinue: clotrimazole-betamethasone (LOTRISONE) 1-0.05 % cream; Apply topically 2 times daily.  -     Cranberry (CRANBERRY CONCENTRATE) 500 MG CAPS; Take 1 capsule by mouth 2 times daily  -     naproxen sodium (ANAPROX) 220 MG tablet; TAKE 1 TABLET BY MOUTH ONCE DAILY AS NEEDED FOR MENSTRUAL CYCLE.  -     bismuth

## 2024-07-30 ENCOUNTER — TELEPHONE (OUTPATIENT)
Dept: PRIMARY CARE CLINIC | Age: 34
End: 2024-07-30

## 2024-07-30 NOTE — TELEPHONE ENCOUNTER
Procedures:  [id number] - US BREASTS COMPLETE  Date Service Ordered 6/27/2023       Order completed

## 2024-08-02 NOTE — TELEPHONE ENCOUNTER
Patients last appointment 6/17/2024.  Patients next scheduled appointment   Future Appointments   Date Time Provider Department Center   12/9/2024  9:00 AM SCHEDULE, [facility] DEP   12/16/2024  2:30 PM Jake Lo APRN - CNP [facility] DEP

## 2024-08-06 NOTE — TELEPHONE ENCOUNTER
Patients last appointment 6/17/2024.  Patients next scheduled appointment   Future Appointments   Date Time Provider Department Center   12/9/2024  9:00 AM SCHEDULE, [facility] DEP   12/16/2024  2:30 PM Jake Lo APRN - CNP [facility] DEP

## 2024-08-20 RX ORDER — NAPROXEN SODIUM 220 MG
TABLET ORAL
Qty: 30 TABLET | Refills: 5 | Status: SHIPPED | OUTPATIENT
Start: 2024-08-20

## 2024-11-08 DIAGNOSIS — K59.00 CONSTIPATION, UNSPECIFIED CONSTIPATION TYPE: ICD-10-CM

## 2024-11-08 RX ORDER — DOCUSATE SODIUM 100 MG/1
CAPSULE, LIQUID FILLED ORAL
Qty: 60 CAPSULE | Refills: 5 | Status: SHIPPED | OUTPATIENT
Start: 2024-11-08

## 2024-11-08 RX ORDER — CETIRIZINE HYDROCHLORIDE 10 MG/1
TABLET ORAL
Qty: 30 TABLET | Refills: 5 | Status: SHIPPED | OUTPATIENT
Start: 2024-11-08

## 2024-11-08 NOTE — TELEPHONE ENCOUNTER
Name of Medication(s) Requested:  Requested Prescriptions     Pending Prescriptions Disp Refills    cetirizine (ZYRTEC) 10 MG tablet [Pharmacy Med Name: CETIRIZINE HCL 10 MG TABLET] 30 tablet 11     Si TABLET BY MOUTH DAILY    docusate sodium (COLACE) 100 MG capsule [Pharmacy Med Name: DOCUSATE SODIUM 100 MG SOFTGEL] 60 capsule 11     Si CAPSULE BY MOUTH TWICE A DAY       Medication is on current medication list Yes    Dosage and directions were verified? Yes    Quantity verified: 30 day supply     Pharmacy Verified?  Yes    Last Appointment:  2024    Future appts:  Future Appointments   Date Time Provider Department Center   2024  9:00 AM [facility]EDU[facility]   2024  2:30 PM Jake Lo APRN - CNP [facility] DEP        (If no appt send self scheduling link. .REFILLAPPT)  Scheduling request sent?     [] Yes  [x] No    Does patient need updated?  [] Yes  [x] No

## 2024-11-15 ENCOUNTER — OFFICE VISIT (OUTPATIENT)
Dept: PRIMARY CARE CLINIC | Age: 34
End: 2024-11-15
Payer: MEDICARE

## 2024-11-15 VITALS
SYSTOLIC BLOOD PRESSURE: 90 MMHG | HEART RATE: 70 BPM | OXYGEN SATURATION: 96 % | DIASTOLIC BLOOD PRESSURE: 60 MMHG | TEMPERATURE: 97.1 F

## 2024-11-15 DIAGNOSIS — R23.1 PALE: ICD-10-CM

## 2024-11-15 DIAGNOSIS — R40.4 UNRESPONSIVE EPISODE: Primary | ICD-10-CM

## 2024-11-15 PROCEDURE — G8484 FLU IMMUNIZE NO ADMIN: HCPCS | Performed by: NURSE PRACTITIONER

## 2024-11-15 PROCEDURE — 99214 OFFICE O/P EST MOD 30 MIN: CPT | Performed by: NURSE PRACTITIONER

## 2024-11-15 PROCEDURE — 1036F TOBACCO NON-USER: CPT | Performed by: NURSE PRACTITIONER

## 2024-11-15 PROCEDURE — G8427 DOCREV CUR MEDS BY ELIG CLIN: HCPCS | Performed by: NURSE PRACTITIONER

## 2024-11-15 PROCEDURE — G8420 CALC BMI NORM PARAMETERS: HCPCS | Performed by: NURSE PRACTITIONER

## 2024-11-15 NOTE — PROGRESS NOTES
Chief Complaint:       No chief complaint on file.      History of Present Illness   Source of history provided by:  caregiver / friend.     Erinn Trinidad is a 34 y.o. old female who presents to the primary care with complaints of pale and some drooping of the eye and off which began 2 days ago.   No cough no subjective fever, mild intermittent HA, sore throat, and mild nasal congestion.  Since onset the symptoms have been gradually worsening.   Pt denies any CP, vomiting, abdominal pain, neck stiffness, or lethargy.  At home treatment has been unsuccessful.        Review of Systems   Unless otherwise stated in this report or unable to obtain because of the patient's clinical or mental status as evidenced by the medical record, this patients's positive and negative responses for Review of Systems, constitutional, psych, eyes, ENT, cardiovascular, respiratory, gastrointestinal, neurological, genitourinary, musculoskeletal, integument systems and systems related to the presenting problem are either stated in the preceding or were not pertinent or were negative for the symptoms and/or complaints related to the medical problem.    Past Medical History:  has a past medical history of Autistic disorder and Mental deficiency.   Past Surgical History:  has no past surgical history on file.  Social History:  reports that she has never smoked. She has never used smokeless tobacco.  Family History: family history is not on file.  Allergies: Patient has no known allergies.    Physical Exam   Vital Signs:  BP 90/60   Pulse 70   Temp 97.1 °F (36.2 °C)   SpO2 96%    Oxygen Saturation Interpretation: Normal.    General Appearance/Constitutional:  sternal rub arousal with no obvious apparent distress noted.  HEENT:  NC/NT. PERRLA.  Airway patent.  Mild posterior pharyngeal erythema without edema.   Neck:  Normal ROM.  Supple.  No adenopathy.    Lungs:   Normal - CTA without rales, rhonchi, or wheezing.  Heart:  Regular

## 2024-12-06 ENCOUNTER — TELEPHONE (OUTPATIENT)
Dept: PRIMARY CARE CLINIC | Age: 34
End: 2024-12-06

## 2024-12-06 DIAGNOSIS — R31.9 HEMATURIA, UNSPECIFIED TYPE: Primary | ICD-10-CM

## 2024-12-06 PROCEDURE — 81002 URINALYSIS NONAUTO W/O SCOPE: CPT | Performed by: NURSE PRACTITIONER

## 2024-12-06 NOTE — TELEPHONE ENCOUNTER
I SPOKE WITH DERRICK AND INFORMED HER THAT PT WOULD NEED TO BE SEEN TO BE ASSESSED AND WOULD NEED A URINE SAMPLE TESTED BUT THERE IS NOT A PROVIDER HERE TODAY. SHE STATES SHE DOES NOT FEEL PT NEEDS IMMEDIATE ASSESSMENT AND THAT SHE IS HAVING LABS DONE ON MONDAY AND WANTS TO KNOW IF WE CAN DO URINE AT THAT TIME AND THEN HAVE BRENDA SEE RESULTS AND MAKE RECOMMENDATION IF SHE NEEDS APPT .  I PUT THE ORDER IN FOR THE POCT URINE.

## 2024-12-09 ENCOUNTER — LAB (OUTPATIENT)
Dept: PRIMARY CARE CLINIC | Age: 34
End: 2024-12-09
Payer: MEDICARE

## 2024-12-09 DIAGNOSIS — Z00.00 ROUTINE GENERAL MEDICAL EXAMINATION AT A HEALTH CARE FACILITY: ICD-10-CM

## 2024-12-09 DIAGNOSIS — Z13.29 SCREENING FOR THYROID DISORDER: ICD-10-CM

## 2024-12-09 DIAGNOSIS — Z13.220 SCREENING FOR LIPOID DISORDERS: ICD-10-CM

## 2024-12-09 DIAGNOSIS — R63.4 WEIGHT LOSS: ICD-10-CM

## 2024-12-09 LAB
ALBUMIN: 4 G/DL (ref 3.5–5.2)
ALP BLD-CCNC: 67 U/L (ref 35–104)
ALT SERPL-CCNC: 7 U/L (ref 0–32)
ANION GAP SERPL CALCULATED.3IONS-SCNC: 13 MMOL/L (ref 7–16)
AST SERPL-CCNC: 13 U/L (ref 0–31)
BACTERIA: ABNORMAL
BASOPHILS ABSOLUTE: 0.06 K/UL (ref 0–0.2)
BASOPHILS RELATIVE PERCENT: 1 % (ref 0–2)
BILIRUB SERPL-MCNC: 0.6 MG/DL (ref 0–1.2)
BILIRUBIN, URINE: NEGATIVE
BUN BLDV-MCNC: 13 MG/DL (ref 6–20)
CALCIUM SERPL-MCNC: 9.4 MG/DL (ref 8.6–10.2)
CHLORIDE BLD-SCNC: 105 MMOL/L (ref 98–107)
CHOLESTEROL, TOTAL: 187 MG/DL
CO2: 19 MMOL/L (ref 22–29)
COLOR, UA: YELLOW
CREAT SERPL-MCNC: 0.7 MG/DL (ref 0.5–1)
EOSINOPHILS ABSOLUTE: 0.04 K/UL (ref 0.05–0.5)
EOSINOPHILS RELATIVE PERCENT: 0 % (ref 0–6)
EPITHELIAL CELLS, UA: ABNORMAL /HPF
GFR, ESTIMATED: >90 ML/MIN/1.73M2
GLUCOSE FASTING: 78 MG/DL (ref 74–99)
GLUCOSE URINE: NEGATIVE MG/DL
HCT VFR BLD CALC: 37.8 % (ref 34–48)
HDLC SERPL-MCNC: 63 MG/DL
HEMOGLOBIN: 11.8 G/DL (ref 11.5–15.5)
IMMATURE GRANULOCYTES %: 0 % (ref 0–5)
IMMATURE GRANULOCYTES ABSOLUTE: 0.04 K/UL (ref 0–0.58)
KETONES, URINE: ABNORMAL MG/DL
LDL CHOLESTEROL: 109 MG/DL
LEUKOCYTE ESTERASE, URINE: ABNORMAL
LYMPHOCYTES ABSOLUTE: 1.93 K/UL (ref 1.5–4)
LYMPHOCYTES RELATIVE PERCENT: 19 % (ref 20–42)
MCH RBC QN AUTO: 23.8 PG (ref 26–35)
MCHC RBC AUTO-ENTMCNC: 31.2 G/DL (ref 32–34.5)
MCV RBC AUTO: 76.4 FL (ref 80–99.9)
MONOCYTES ABSOLUTE: 0.41 K/UL (ref 0.1–0.95)
MONOCYTES RELATIVE PERCENT: 4 % (ref 2–12)
NEUTROPHILS ABSOLUTE: 7.46 K/UL (ref 1.8–7.3)
NEUTROPHILS RELATIVE PERCENT: 75 % (ref 43–80)
NITRITE, URINE: NEGATIVE
PDW BLD-RTO: 14.6 % (ref 11.5–15)
PH, URINE: 6 (ref 5–9)
PLATELET # BLD: 357 K/UL (ref 130–450)
PMV BLD AUTO: 11.3 FL (ref 7–12)
POTASSIUM SERPL-SCNC: 3.8 MMOL/L (ref 3.5–5)
PROTEIN UA: NEGATIVE MG/DL
RBC # BLD: 4.95 M/UL (ref 3.5–5.5)
RBC UA: ABNORMAL /HPF
SODIUM BLD-SCNC: 137 MMOL/L (ref 132–146)
SPECIFIC GRAVITY UA: 1.02 (ref 1–1.03)
TOTAL PROTEIN: 7.9 G/DL (ref 6.4–8.3)
TRIGL SERPL-MCNC: 76 MG/DL
TSH SERPL DL<=0.05 MIU/L-ACNC: 2.22 UIU/ML (ref 0.27–4.2)
TURBIDITY: ABNORMAL
URINE HGB: ABNORMAL
UROBILINOGEN, URINE: 0.2 EU/DL (ref 0–1)
VLDLC SERPL CALC-MCNC: 15 MG/DL
WBC # BLD: 9.9 K/UL (ref 4.5–11.5)
WBC UA: ABNORMAL /HPF

## 2024-12-09 PROCEDURE — 36415 COLL VENOUS BLD VENIPUNCTURE: CPT | Performed by: NURSE PRACTITIONER

## 2024-12-13 ASSESSMENT — PATIENT HEALTH QUESTIONNAIRE - PHQ9
SUM OF ALL RESPONSES TO PHQ QUESTIONS 1-9: 1
SUM OF ALL RESPONSES TO PHQ9 QUESTIONS 1 & 2: 1
1. LITTLE INTEREST OR PLEASURE IN DOING THINGS: SEVERAL DAYS
1. LITTLE INTEREST OR PLEASURE IN DOING THINGS: SEVERAL DAYS
SUM OF ALL RESPONSES TO PHQ QUESTIONS 1-9: 1
SUM OF ALL RESPONSES TO PHQ9 QUESTIONS 1 & 2: 1
2. FEELING DOWN, DEPRESSED OR HOPELESS: NOT AT ALL
2. FEELING DOWN, DEPRESSED OR HOPELESS: NOT AT ALL

## 2024-12-16 ENCOUNTER — OFFICE VISIT (OUTPATIENT)
Dept: PRIMARY CARE CLINIC | Age: 34
End: 2024-12-16

## 2024-12-16 VITALS
HEIGHT: 55 IN | TEMPERATURE: 97.9 F | SYSTOLIC BLOOD PRESSURE: 95 MMHG | WEIGHT: 86 LBS | DIASTOLIC BLOOD PRESSURE: 68 MMHG | HEART RATE: 70 BPM | OXYGEN SATURATION: 96 % | BODY MASS INDEX: 19.9 KG/M2 | RESPIRATION RATE: 16 BRPM

## 2024-12-16 DIAGNOSIS — F84.0 AUTISTIC DISORDER: ICD-10-CM

## 2024-12-16 DIAGNOSIS — Z79.899 LONG-TERM CURRENT USE OF ANXIOLYTIC MEDICATION: ICD-10-CM

## 2024-12-16 DIAGNOSIS — F79 MENTAL DEFICIENCY: ICD-10-CM

## 2024-12-16 DIAGNOSIS — N92.6 IRREGULAR PERIODS: Primary | ICD-10-CM

## 2024-12-16 DIAGNOSIS — N92.1 MENORRHAGIA WITH IRREGULAR CYCLE: ICD-10-CM

## 2024-12-16 DIAGNOSIS — Z00.00 ROUTINE GENERAL MEDICAL EXAMINATION AT A HEALTH CARE FACILITY: ICD-10-CM

## 2024-12-16 LAB
IRON % SATURATION: 31 % (ref 15–50)
IRON: 121 UG/DL (ref 37–145)
TOTAL IRON BINDING CAPACITY: 386 UG/DL (ref 250–450)

## 2024-12-16 ASSESSMENT — ENCOUNTER SYMPTOMS
COLOR CHANGE: 0
RECTAL PAIN: 0
ANAL BLEEDING: 0
ABDOMINAL PAIN: 0
BLOOD IN STOOL: 0

## 2024-12-16 NOTE — PROGRESS NOTES
Erinn Trinidad : 1990 Sex: female  Age: 34 y.o.    Chief Complaint   Patient presents with    Discuss Labs     Lab review 6 month check up ,       ASSESSMENT AND PLAN     Erinn was seen today for discuss labs.    Diagnoses and all orders for this visit:    Irregular periods  -     External Referral To OB/GYN  -     Cancel: Iron and TIBC; Future  -     Iron and TIBC; Future  -     Iron and TIBC    Mental deficiency    Menorrhagia with irregular cycle  -     External Referral To OB/GYN  -     Cancel: Iron and TIBC; Future  -     Iron and TIBC; Future  -     Iron and TIBC    Autistic disorder    Long-term current use of anxiolytic medication    Routine general medical examination at a health care facility      Assessment & Plan  1. UTI - Recently treated for UTI and slight dehydration in the ER. Current urine sample shows slight cloudiness, trace ketones, trace blood, and some leukocytes, but no nitrates, indicating no current infection.  - Monitor for any signs of infection.  - Ensure adequate hydration.    2. Breakthrough bleeding - Experiencing consecutive periods with breakthrough bleeding since the UTI.  - Referral to a gynecologist for further evaluation.    3. Medication management - Currently on Prozac 40 mg in the morning and 60 mg at noon, and clonazepam. No longer taking Celexa or Ativan.  - Continue current medication regimen.    4. Suspected iron deficiency - Blood work indicates potential iron deficiency, though not anemic. Reports shaking and feeling cold, possibly related to low iron levels.  - Additional lab tests for TIBC and iron levels will be ordered.  - If low iron levels are confirmed, an iron supplement will be added to her regimen.    Follow-up in 3 months.  - Recheck lab work to assess iron levels and overall health status.         Lab / Imaging Results   (All laboratory and radiology results have been personally reviewed by myself)  Labs:  No results found for this visit on

## 2025-02-04 ENCOUNTER — TELEPHONE (OUTPATIENT)
Dept: FAMILY MEDICINE CLINIC | Age: 35
End: 2025-02-04

## 2025-02-04 DIAGNOSIS — R82.79 OTHER ABNORMAL FINDINGS ON MICROBIOLOGICAL EXAMINATION OF URINE: ICD-10-CM

## 2025-02-04 DIAGNOSIS — F79 MENTAL DEFICIENCY: Primary | ICD-10-CM

## 2025-02-04 DIAGNOSIS — F41.9 ANXIETY: ICD-10-CM

## 2025-02-04 NOTE — TELEPHONE ENCOUNTER
I pended an order for urinalysis and culture to be done.  I messaged patient nurse Shital to bring urine sample by to be tested and cultured .

## 2025-02-10 DIAGNOSIS — R82.79 OTHER ABNORMAL FINDINGS ON MICROBIOLOGICAL EXAMINATION OF URINE: ICD-10-CM

## 2025-02-10 DIAGNOSIS — F79 MENTAL DEFICIENCY: ICD-10-CM

## 2025-02-10 DIAGNOSIS — F41.9 ANXIETY: ICD-10-CM

## 2025-02-10 LAB
BILIRUBIN, URINE: NEGATIVE
COLOR, UA: YELLOW
COMMENT: NORMAL
GLUCOSE URINE: NEGATIVE MG/DL
KETONES, URINE: NEGATIVE MG/DL
LEUKOCYTE ESTERASE, URINE: NEGATIVE
NITRITE, URINE: NEGATIVE
PH, URINE: 7 (ref 5–8)
PROTEIN UA: NEGATIVE MG/DL
SPECIFIC GRAVITY UA: 1.02 (ref 1–1.03)
TURBIDITY: CLEAR
URINE HGB: NEGATIVE
UROBILINOGEN, URINE: 1 EU/DL (ref 0–1)

## 2025-02-12 LAB
CULTURE: NORMAL
SPECIMEN DESCRIPTION: NORMAL

## 2025-02-18 RX ORDER — AMMONIUM LACTATE 12 G/100G
LOTION TOPICAL
Qty: 225 G | Refills: 5 | Status: SHIPPED | OUTPATIENT
Start: 2025-02-18

## 2025-02-18 NOTE — TELEPHONE ENCOUNTER
Name of Medication(s) Requested:  Requested Prescriptions     Pending Prescriptions Disp Refills    ammonium lactate (LAC-HYDRIN) 12 % lotion [Pharmacy Med Name: AMMONIUM LACTATE 12% LOTION] 225 g 5     Sig: APPLY TOPICALLY TO BODY DAILY AFTER SHOWER       Medication is on current medication list Yes    Dosage and directions were verified? Yes    Quantity verified: 30 day supply     Pharmacy Verified?  Yes    Last Appointment:  12/16/2024    Future appts:  Future Appointments   Date Time Provider Department Center   3/10/2025  9:00 AM SCHEDULE, [facility] DEP   3/17/2025  1:00 PM Jake Lo APRN - CNP [facility] DEP   1/8/2026 10:30 AM Fariba Mary, DO [facility]        (If no appt send self scheduling link. .REFILLAPPT)  Scheduling request sent?     [] Yes  [x] No    Does patient need updated?  [] Yes  [x] No

## 2025-02-26 ENCOUNTER — PATIENT MESSAGE (OUTPATIENT)
Dept: PRIMARY CARE CLINIC | Age: 35
End: 2025-02-26

## 2025-02-26 DIAGNOSIS — R39.198 VOIDING DIFFICULTY: Primary | ICD-10-CM

## 2025-03-07 ENCOUNTER — TELEPHONE (OUTPATIENT)
Dept: PRIMARY CARE CLINIC | Age: 35
End: 2025-03-07

## 2025-03-10 ENCOUNTER — TELEPHONE (OUTPATIENT)
Dept: PRIMARY CARE CLINIC | Age: 35
End: 2025-03-10

## 2025-03-12 ENCOUNTER — TELEPHONE (OUTPATIENT)
Dept: PRIMARY CARE CLINIC | Age: 35
End: 2025-03-12

## 2025-03-12 NOTE — TELEPHONE ENCOUNTER
Bill Isaacs from [facility] called and states they would like Yong to be seen sooner than Monday but I did inform you had no sooner availability. She states Yong has been walking funny and noticed she was walking strange last week but she was able to physically see her Monday and noticed her right foot is angled in and they would like you to take a look at it. I am aware that you work [employer] on Friday and in the past we have told your patients that they could come in and see. I did confirm with Umu that we still do this and advised Shital that yong could be seen through [facility]  by you on Friday.     Shital is out of the office right now so she asks if you need to talk with her to call her cell. [phone]

## 2025-03-13 ENCOUNTER — LAB (OUTPATIENT)
Dept: PRIMARY CARE CLINIC | Age: 35
End: 2025-03-13

## 2025-03-13 DIAGNOSIS — N92.6 IRREGULAR PERIODS: Primary | ICD-10-CM

## 2025-03-13 DIAGNOSIS — R63.4 WEIGHT LOSS: ICD-10-CM

## 2025-03-13 DIAGNOSIS — Z79.899 LONG-TERM CURRENT USE OF ANXIOLYTIC MEDICATION: ICD-10-CM

## 2025-03-13 DIAGNOSIS — N92.1 MENORRHAGIA WITH IRREGULAR CYCLE: ICD-10-CM

## 2025-03-13 LAB
ALBUMIN: 4.7 G/DL (ref 3.5–5.2)
ALP BLD-CCNC: 76 U/L (ref 35–104)
ALT SERPL-CCNC: 20 U/L (ref 0–32)
ANION GAP SERPL CALCULATED.3IONS-SCNC: 18 MMOL/L (ref 7–16)
AST SERPL-CCNC: 24 U/L (ref 0–31)
BACTERIA: ABNORMAL
BASOPHILS ABSOLUTE: 0.04 K/UL (ref 0–0.2)
BASOPHILS RELATIVE PERCENT: 1 % (ref 0–2)
BILIRUB SERPL-MCNC: 0.4 MG/DL (ref 0–1.2)
BILIRUBIN, URINE: NEGATIVE
BUN BLDV-MCNC: 13 MG/DL (ref 6–20)
CALCIUM SERPL-MCNC: 9.9 MG/DL (ref 8.6–10.2)
CHLORIDE BLD-SCNC: 102 MMOL/L (ref 98–107)
CHOLESTEROL, TOTAL: 189 MG/DL
CO2: 16 MMOL/L (ref 22–29)
COLOR, UA: YELLOW
CREAT SERPL-MCNC: 0.7 MG/DL (ref 0.5–1)
EOSINOPHILS ABSOLUTE: 0.04 K/UL (ref 0.05–0.5)
EOSINOPHILS RELATIVE PERCENT: 1 % (ref 0–6)
EPITHELIAL CELLS, UA: ABNORMAL /HPF
GFR, ESTIMATED: >90 ML/MIN/1.73M2
GLUCOSE BLD-MCNC: 76 MG/DL (ref 74–99)
GLUCOSE URINE: NEGATIVE MG/DL
HCT VFR BLD CALC: 40.4 % (ref 34–48)
HDLC SERPL-MCNC: 62 MG/DL
HEMOGLOBIN: 12.3 G/DL (ref 11.5–15.5)
IMMATURE GRANULOCYTES %: 1 % (ref 0–5)
IMMATURE GRANULOCYTES ABSOLUTE: 0.04 K/UL (ref 0–0.58)
IRON % SATURATION: 15 % (ref 15–50)
IRON: 64 UG/DL (ref 37–145)
KETONES, URINE: NEGATIVE MG/DL
LDL CHOLESTEROL: 109 MG/DL
LEUKOCYTE ESTERASE, URINE: NEGATIVE
LYMPHOCYTES ABSOLUTE: 1.88 K/UL (ref 1.5–4)
LYMPHOCYTES RELATIVE PERCENT: 22 % (ref 20–42)
MCH RBC QN AUTO: 24.2 PG (ref 26–35)
MCHC RBC AUTO-ENTMCNC: 30.4 G/DL (ref 32–34.5)
MCV RBC AUTO: 79.4 FL (ref 80–99.9)
MONOCYTES ABSOLUTE: 0.42 K/UL (ref 0.1–0.95)
MONOCYTES RELATIVE PERCENT: 5 % (ref 2–12)
NEUTROPHILS ABSOLUTE: 6.32 K/UL (ref 1.8–7.3)
NEUTROPHILS RELATIVE PERCENT: 72 % (ref 43–80)
NITRITE, URINE: NEGATIVE
PDW BLD-RTO: 15.2 % (ref 11.5–15)
PH, URINE: 6.5 (ref 5–8)
PLATELET # BLD: 382 K/UL (ref 130–450)
PMV BLD AUTO: 11.2 FL (ref 7–12)
POTASSIUM SERPL-SCNC: 4.1 MMOL/L (ref 3.5–5)
PROTEIN UA: NEGATIVE MG/DL
RBC # BLD: 5.09 M/UL (ref 3.5–5.5)
RBC UA: ABNORMAL /HPF
SODIUM BLD-SCNC: 136 MMOL/L (ref 132–146)
SPECIFIC GRAVITY UA: 1.02 (ref 1–1.03)
TOTAL IRON BINDING CAPACITY: 436 UG/DL (ref 250–450)
TOTAL PROTEIN: 8.8 G/DL (ref 6.4–8.3)
TRIGL SERPL-MCNC: 91 MG/DL
TSH SERPL DL<=0.05 MIU/L-ACNC: 2.39 UIU/ML (ref 0.27–4.2)
TURBIDITY: CLEAR
URINE HGB: ABNORMAL
UROBILINOGEN, URINE: 0.2 EU/DL (ref 0–1)
VLDLC SERPL CALC-MCNC: 18 MG/DL
WBC # BLD: 8.7 K/UL (ref 4.5–11.5)
WBC UA: ABNORMAL /HPF

## 2025-03-14 ENCOUNTER — OFFICE VISIT (OUTPATIENT)
Dept: PRIMARY CARE CLINIC | Age: 35
End: 2025-03-14

## 2025-03-14 ENCOUNTER — PATIENT MESSAGE (OUTPATIENT)
Dept: PRIMARY CARE CLINIC | Age: 35
End: 2025-03-14

## 2025-03-14 VITALS
SYSTOLIC BLOOD PRESSURE: 103 MMHG | OXYGEN SATURATION: 98 % | TEMPERATURE: 98.2 F | HEIGHT: 55 IN | WEIGHT: 79.6 LBS | DIASTOLIC BLOOD PRESSURE: 76 MMHG | BODY MASS INDEX: 18.42 KG/M2 | HEART RATE: 93 BPM

## 2025-03-14 DIAGNOSIS — R26.89 BALANCE PROBLEM: Primary | ICD-10-CM

## 2025-03-14 DIAGNOSIS — R42 VERTIGO: ICD-10-CM

## 2025-03-14 DIAGNOSIS — H61.21 IMPACTED CERUMEN OF RIGHT EAR: ICD-10-CM

## 2025-03-14 DIAGNOSIS — Z91.81 HISTORY OF RECENT FALL: ICD-10-CM

## 2025-03-14 RX ORDER — MECLIZINE HCL 12.5 MG 12.5 MG/1
12.5 TABLET ORAL 2 TIMES DAILY
Qty: 15 TABLET | Refills: 0 | Status: SHIPPED | OUTPATIENT
Start: 2025-03-14 | End: 2025-03-24

## 2025-03-14 RX ORDER — MECLIZINE HCL 12.5 MG 12.5 MG/1
12.5 TABLET ORAL 2 TIMES DAILY PRN
Qty: 15 TABLET | Refills: 0 | Status: SHIPPED
Start: 2025-03-14 | End: 2025-03-14 | Stop reason: SDUPTHER

## 2025-03-14 SDOH — ECONOMIC STABILITY: FOOD INSECURITY: WITHIN THE PAST 12 MONTHS, THE FOOD YOU BOUGHT JUST DIDN'T LAST AND YOU DIDN'T HAVE MONEY TO GET MORE.: NEVER TRUE

## 2025-03-14 SDOH — ECONOMIC STABILITY: FOOD INSECURITY: WITHIN THE PAST 12 MONTHS, YOU WORRIED THAT YOUR FOOD WOULD RUN OUT BEFORE YOU GOT MONEY TO BUY MORE.: NEVER TRUE

## 2025-03-14 ASSESSMENT — PATIENT HEALTH QUESTIONNAIRE - PHQ9
1. LITTLE INTEREST OR PLEASURE IN DOING THINGS: NOT AT ALL
SUM OF ALL RESPONSES TO PHQ QUESTIONS 1-9: 0
2. FEELING DOWN, DEPRESSED OR HOPELESS: NOT AT ALL
SUM OF ALL RESPONSES TO PHQ QUESTIONS 1-9: 0

## 2025-03-14 ASSESSMENT — ENCOUNTER SYMPTOMS
ABDOMINAL PAIN: 0
RECTAL PAIN: 0
COLOR CHANGE: 0
ANAL BLEEDING: 0
BLOOD IN STOOL: 0

## 2025-03-14 NOTE — PROGRESS NOTES
to patient at the end of visit.      Return in about 4 weeks (around 4/11/2025).    USPTF     No results found for: \"LABA1C\"  No results found for: \"EAG\"   Abnormal Blood Glucose and Type 2 Diabetes Mellitus: Screening -- Adults aged 40 to 70 years who are overweight or obese Grade: B (Recommended)    BP Readings from Last 3 Encounters:   03/14/25 103/76   03/10/25 (!) 141/93   01/02/25 128/86     High Blood Pressure: Screening and Home Monitoring -- Adults  Grade: A (Recommended) recommends screening for high blood pressure in ages 18 years or older.   obtain measurements outside of the clinical setting for diagnostic confirmation before starting treatment. Annual screening for adults aged 40 years or older or those who are at increased risk for blood pressure    (  ) Colorectal Cancer: Screening --Adults aged 50 to 75 years  Grade: A (Recommended) recommends screening for colorectal cancer starting at age 50 years and continuing until age 75 years.     Lab Results   Component Value Date    CHOL 189 03/13/2025    CHOL 187 12/09/2024    CHOL 171 10/19/2023     Lab Results   Component Value Date    TRIG 91 03/13/2025    TRIG 76 12/09/2024    TRIG 81 10/19/2023     Lab Results   Component Value Date    HDL 62 03/13/2025    HDL 63 12/09/2024    HDL 59 10/19/2023     No components found for: \"LDLCHOLESTEROL\", \"LDLCALC\"    Lab Results   Component Value Date    VLDL 18 03/13/2025    VLDL 15 12/09/2024    VLDL 16 10/19/2023     Lab Results   Component Value Date    CHOLHDLRATIO 1.3 03/13/2021      (  )  Lipid Disorders in Adults: Screening -- Men 35 and Older  Grade: A (Recommended) recommends screening men aged 35 and older for lipid disorders.     Alcohol Use: Not on file      (NEVER) Alcohol Misuse: Screening and Behavioral Counseling Interventions in Primary Care -- Adults  Grade: B (Recommended) recommends that clinicians screen adults aged 18 years or older for alcohol misuse and provide persons engaged in risky or

## 2025-05-02 ENCOUNTER — TELEPHONE (OUTPATIENT)
Dept: PRIMARY CARE CLINIC | Age: 35
End: 2025-05-02

## 2025-05-13 DIAGNOSIS — M54.50 LOW BACK PAIN WITHOUT SCIATICA, UNSPECIFIED BACK PAIN LATERALITY, UNSPECIFIED CHRONICITY: ICD-10-CM

## 2025-05-13 DIAGNOSIS — K59.00 CONSTIPATION, UNSPECIFIED CONSTIPATION TYPE: ICD-10-CM

## 2025-05-14 DIAGNOSIS — R19.7 DIARRHEA, UNSPECIFIED TYPE: ICD-10-CM

## 2025-05-14 DIAGNOSIS — R12 HEART BURN: Primary | ICD-10-CM

## 2025-05-14 RX ORDER — BISMUTH SUBSALICYLATE 262 MG/1
524 TABLET, CHEWABLE ORAL DAILY PRN
Qty: 60 TABLET | Refills: 5 | Status: SHIPPED | OUTPATIENT
Start: 2025-05-14

## 2025-05-14 RX ORDER — LOPERAMIDE HYDROCHLORIDE 1 MG/5ML
1 SOLUTION ORAL 3 TIMES DAILY PRN
Qty: 15 ML | Refills: 5 | Status: SHIPPED | OUTPATIENT
Start: 2025-05-14 | End: 2025-05-21

## 2025-05-14 RX ORDER — ACETAMINOPHEN 325 MG/1
TABLET ORAL
Qty: 31 TABLET | Refills: 5 | Status: SHIPPED | OUTPATIENT
Start: 2025-05-14

## 2025-05-14 RX ORDER — IBUPROFEN 200 MG
TABLET ORAL
Qty: 31 TABLET | Refills: 5 | Status: SHIPPED | OUTPATIENT
Start: 2025-05-14

## 2025-05-14 RX ORDER — DOCUSATE SODIUM 100 MG/1
CAPSULE, LIQUID FILLED ORAL
Qty: 62 CAPSULE | Refills: 5 | Status: SHIPPED | OUTPATIENT
Start: 2025-05-14

## 2025-05-14 RX ORDER — CETIRIZINE HYDROCHLORIDE 10 MG/1
TABLET ORAL
Qty: 31 TABLET | Refills: 5 | Status: SHIPPED | OUTPATIENT
Start: 2025-05-14

## 2025-05-14 NOTE — TELEPHONE ENCOUNTER
Name of Medication(s) Requested:  Requested Prescriptions     Pending Prescriptions Disp Refills    cetirizine (ZYRTEC) 10 MG tablet [Pharmacy Med Name: CETIRIZINE HCL 10 MG TABLET] 31 tablet 5     Si TABLET BY MOUTH DAILY    docusate sodium (COLACE) 100 MG capsule [Pharmacy Med Name: DOCUSATE SODIUM 100 MG SOFTGEL] 62 capsule 5     Si CAPSULE BY MOUTH TWICE A DAY       Medication is on current medication list Yes    Dosage and directions were verified? Yes    Quantity verified: 30 day supply     Pharmacy Verified?  Yes    Last Appointment:  3/14/2025    Future appts:  Future Appointments   Date Time Provider Department Center   2025 11:00 AM Tania Willoughby, APRN - CNP [facility]   2025 10:40 AM Josesito Vickers, APRN - CNS [facility] NEURO Neurology -   2026 10:30 AM Fariba Mary DO [facility]        (If no appt send self scheduling link. .REFILLAPPT)  Scheduling request sent?     [] Yes  [] No    Does patient need updated?  [] Yes  [] No

## 2025-05-14 NOTE — TELEPHONE ENCOUNTER
Name of Medication(s) Requested:  Requested Prescriptions     Pending Prescriptions Disp Refills    ibuprofen (ADVIL;MOTRIN) 200 MG tablet [Pharmacy Med Name: IBUPROFEN 200 MG TABLET] 31 tablet 5     Si TABLET BY MOUTH EVERY 8 HOURS AS NEEDED FOR PAIN (MAX 6 TABLETS/DAY)    acetaminophen (TYLENOL) 325 MG tablet [Pharmacy Med Name: ACETAMINOPHEN 325 MG TABLET] 31 tablet 5     Si TABLET BY MOUTH EVERY 6 HOURS AS NEEDED FOR ELEVATED TEMP 100 OR OVER       Medication is on current medication list Yes    Dosage and directions were verified? Yes    Quantity verified: 30 day supply     Pharmacy Verified?  Yes    Last Appointment:  3/14/2025    Future appts:  Future Appointments   Date Time Provider Department Center   2025 11:00 AM Tania Willoughby, HAMLET - CNP [facility]   2025 10:40 AM Josesito Vickers APRN - CNS [facility] NEURO Neurology -   2026 10:30 AM Fariba Mary DO [facility]        (If no appt send self scheduling link. .REFILLAPPT)  Scheduling request sent?     [] Yes  [] No    Does patient need updated?  [] Yes  [] No

## 2025-06-17 DIAGNOSIS — R26.9 GAIT DIFFICULTY: ICD-10-CM

## 2025-06-27 ENCOUNTER — OFFICE VISIT (OUTPATIENT)
Age: 35
End: 2025-06-27
Payer: MEDICARE

## 2025-06-27 VITALS
RESPIRATION RATE: 18 BRPM | BODY MASS INDEX: 17.04 KG/M2 | SYSTOLIC BLOOD PRESSURE: 161 MMHG | HEIGHT: 57 IN | WEIGHT: 79 LBS | TEMPERATURE: 98.2 F | DIASTOLIC BLOOD PRESSURE: 99 MMHG | HEART RATE: 85 BPM | OXYGEN SATURATION: 98 %

## 2025-06-27 DIAGNOSIS — R26.9 GAIT DIFFICULTY: Primary | ICD-10-CM

## 2025-06-27 PROCEDURE — G8427 DOCREV CUR MEDS BY ELIG CLIN: HCPCS | Performed by: CLINICAL NURSE SPECIALIST

## 2025-06-27 PROCEDURE — 99214 OFFICE O/P EST MOD 30 MIN: CPT | Performed by: CLINICAL NURSE SPECIALIST

## 2025-06-27 PROCEDURE — 99213 OFFICE O/P EST LOW 20 MIN: CPT | Performed by: CLINICAL NURSE SPECIALIST

## 2025-06-27 PROCEDURE — G8419 CALC BMI OUT NRM PARAM NOF/U: HCPCS | Performed by: CLINICAL NURSE SPECIALIST

## 2025-06-27 PROCEDURE — 1036F TOBACCO NON-USER: CPT | Performed by: CLINICAL NURSE SPECIALIST

## 2025-06-27 RX ORDER — CLONAZEPAM 1 MG/1
1 TABLET ORAL DAILY
COMMUNITY
Start: 2025-06-09 | End: 2025-06-27

## 2025-06-27 RX ORDER — NORETHINDRONE ACETATE AND ETHINYL ESTRADIOL .03; 1.5 MG/1; MG/1
1 TABLET ORAL DAILY
COMMUNITY
Start: 2025-06-17

## 2025-06-27 NOTE — PROGRESS NOTES
Erinn Trinidad is a 34 y.o. right handed woman    Patient was referred to our office for gait difficulties following a urinary tract infection.    Caregiver who accompanies her I know really well-I also cared for many of the patient's siblings.    Reportedly had minimal gait difficulties last year and even in the winter however she developed a severe UTI was unresponsive and received numerous weeks of IV and oral antibiotics.  She recovered well thankfully but had gait difficulties afterwards.  She was then identified as having a ear infection and even with treatment her gait difficulties did not improve.    Because another family so well was recommended to follow with neurology.  She did have a CT at [place] of her head which I cannot personally review at this time which was reportedly unrevealing.  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 but also significant white matter disease-no acute abnormality was identified    I did discuss with caregiver/team abnormalities most likely congenital in nature    Thankfully no falls but still with marked difficulties ambulating    Objective:   BP (!) 161/99 (BP Site: Right Upper Arm, Patient Position: Sitting, BP Cuff Size: Small Adult)   Pulse 85   Temp 98.2 °F (36.8 °C) (Temporal)   Resp 18   Ht 1.448 m (4' 9\")   Wt 35.8 kg (79 lb)   LMP  (LMP Unknown)   SpO2 98%   BMI 17.10 kg/m²      General appearance: Awake looking around the room  Head: Normocephalic, without obvious abnormality, atraumatic  Extremities: no cyanosis or edema  Pulses: 2+ and symmetric  Skin: no rashes or lesions     Mental Status: Awake looking around the room    Nonverbal    Not following commands    Cranial Nerves:  I: smell    II: visual acuity     II: visual fields Bilateral threat   II: pupils EMILIE   III,VII: ptosis None   III,IV,VI: extraocular muscles  Follows examiner around the room   V: mastication Normal   V: facial light touch sensation  Normal

## 2025-08-08 ENCOUNTER — OFFICE VISIT (OUTPATIENT)
Age: 35
End: 2025-08-08
Payer: MEDICARE

## 2025-08-08 VITALS
WEIGHT: 80 LBS | HEART RATE: 100 BPM | OXYGEN SATURATION: 100 % | SYSTOLIC BLOOD PRESSURE: 118 MMHG | HEIGHT: 59 IN | TEMPERATURE: 97.9 F | DIASTOLIC BLOOD PRESSURE: 78 MMHG | BODY MASS INDEX: 16.13 KG/M2

## 2025-08-08 DIAGNOSIS — R26.9 GAIT DIFFICULTY: Primary | ICD-10-CM

## 2025-08-08 PROCEDURE — 99213 OFFICE O/P EST LOW 20 MIN: CPT | Performed by: CLINICAL NURSE SPECIALIST
